# Patient Record
Sex: MALE | Race: WHITE | NOT HISPANIC OR LATINO | Employment: UNEMPLOYED | URBAN - NONMETROPOLITAN AREA
[De-identification: names, ages, dates, MRNs, and addresses within clinical notes are randomized per-mention and may not be internally consistent; named-entity substitution may affect disease eponyms.]

---

## 2017-01-12 DIAGNOSIS — I25.10 CORONARY ARTERY DISEASE INVOLVING NATIVE CORONARY ARTERY OF NATIVE HEART WITHOUT ANGINA PECTORIS: Primary | ICD-10-CM

## 2017-01-12 RX ORDER — CLOPIDOGREL BISULFATE 75 MG/1
75 TABLET ORAL DAILY
Qty: 30 TABLET | Refills: 6 | Status: SHIPPED | OUTPATIENT
Start: 2017-01-12 | End: 2017-08-08 | Stop reason: SDUPTHER

## 2017-03-08 ENCOUNTER — OFFICE VISIT (OUTPATIENT)
Dept: CARDIOLOGY | Facility: CLINIC | Age: 57
End: 2017-03-08

## 2017-03-08 VITALS
WEIGHT: 245.4 LBS | OXYGEN SATURATION: 99 % | HEART RATE: 68 BPM | SYSTOLIC BLOOD PRESSURE: 161 MMHG | RESPIRATION RATE: 16 BRPM | BODY MASS INDEX: 34.35 KG/M2 | DIASTOLIC BLOOD PRESSURE: 101 MMHG | HEIGHT: 71 IN

## 2017-03-08 DIAGNOSIS — E78.5 HYPERLIPIDEMIA, UNSPECIFIED HYPERLIPIDEMIA TYPE: ICD-10-CM

## 2017-03-08 DIAGNOSIS — E11.9 TYPE 2 DIABETES MELLITUS WITHOUT COMPLICATION, WITHOUT LONG-TERM CURRENT USE OF INSULIN (HCC): ICD-10-CM

## 2017-03-08 DIAGNOSIS — I10 ESSENTIAL HYPERTENSION: Primary | ICD-10-CM

## 2017-03-08 DIAGNOSIS — I25.10 CORONARY ARTERY DISEASE INVOLVING NATIVE CORONARY ARTERY OF NATIVE HEART WITHOUT ANGINA PECTORIS: ICD-10-CM

## 2017-03-08 PROCEDURE — 99214 OFFICE O/P EST MOD 30 MIN: CPT | Performed by: NURSE PRACTITIONER

## 2017-03-08 PROCEDURE — 93000 ELECTROCARDIOGRAM COMPLETE: CPT | Performed by: NURSE PRACTITIONER

## 2017-03-08 RX ORDER — CLONIDINE HYDROCHLORIDE 0.1 MG/1
0.1 TABLET ORAL ONCE
Status: COMPLETED | OUTPATIENT
Start: 2017-03-08 | End: 2017-03-08

## 2017-03-08 RX ORDER — CLONIDINE HYDROCHLORIDE 0.1 MG/1
0.1 TABLET ORAL 3 TIMES DAILY PRN
Qty: 30 TABLET | Refills: 5 | Status: SHIPPED | OUTPATIENT
Start: 2017-03-08 | End: 2018-08-21

## 2017-03-08 RX ADMIN — CLONIDINE HYDROCHLORIDE 0.1 MG: 0.1 TABLET ORAL at 09:40

## 2017-03-08 NOTE — PROGRESS NOTES
Procedure     ECG 12 Lead  Date/Time: 3/8/2017 9:46 AM  Performed by: TANA TORRES  Authorized by: TANA TORRES   Comparison: compared with previous ECG from 3/8/2016  Similar to previous ECG  Rhythm: sinus rhythm  Rate: normal  BPM: 62  QRS axis: normal  Q waves: II  Clinical impression: non-specific ECG

## 2017-03-08 NOTE — PATIENT INSTRUCTIONS
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
## 2017-04-19 ENCOUNTER — OFFICE VISIT (OUTPATIENT)
Dept: CARDIOLOGY | Facility: CLINIC | Age: 57
End: 2017-04-19

## 2017-04-19 VITALS
OXYGEN SATURATION: 99 % | HEIGHT: 71 IN | DIASTOLIC BLOOD PRESSURE: 99 MMHG | WEIGHT: 245 LBS | BODY MASS INDEX: 34.3 KG/M2 | SYSTOLIC BLOOD PRESSURE: 167 MMHG | HEART RATE: 65 BPM

## 2017-04-19 DIAGNOSIS — I10 ESSENTIAL HYPERTENSION: Primary | ICD-10-CM

## 2017-04-19 DIAGNOSIS — E78.5 HYPERLIPIDEMIA, UNSPECIFIED HYPERLIPIDEMIA TYPE: ICD-10-CM

## 2017-04-19 DIAGNOSIS — E11.9 TYPE 2 DIABETES MELLITUS WITHOUT COMPLICATION, WITHOUT LONG-TERM CURRENT USE OF INSULIN (HCC): ICD-10-CM

## 2017-04-19 DIAGNOSIS — I25.10 ATHEROSCLEROSIS OF NATIVE CORONARY ARTERY OF NATIVE HEART WITHOUT ANGINA PECTORIS: ICD-10-CM

## 2017-04-19 PROCEDURE — 99213 OFFICE O/P EST LOW 20 MIN: CPT | Performed by: NURSE PRACTITIONER

## 2017-04-19 RX ORDER — LISINOPRIL 40 MG/1
40 TABLET ORAL DAILY
COMMUNITY
End: 2017-07-18

## 2017-04-19 RX ORDER — LOSARTAN POTASSIUM 50 MG/1
50 TABLET ORAL 2 TIMES DAILY
COMMUNITY
Start: 2017-04-10 | End: 2018-07-10 | Stop reason: SDUPTHER

## 2017-04-19 NOTE — PATIENT INSTRUCTIONS
Hypertension  Hypertension, commonly called high blood pressure, is when the force of blood pumping through your arteries is too strong. Your arteries are the blood vessels that carry blood from your heart throughout your body. A blood pressure reading consists of a higher number over a lower number, such as 110/72. The higher number (systolic) is the pressure inside your arteries when your heart pumps. The lower number (diastolic) is the pressure inside your arteries when your heart relaxes. Ideally you want your blood pressure below 120/80.  Hypertension forces your heart to work harder to pump blood. Your arteries may become narrow or stiff. Having untreated or uncontrolled hypertension can cause heart attack, stroke, kidney disease, and other problems.  RISK FACTORS  Some risk factors for high blood pressure are controllable. Others are not.   Risk factors you cannot control include:   · Race. You may be at higher risk if you are .  · Age. Risk increases with age.  · Gender. Men are at higher risk than women before age 45 years. After age 65, women are at higher risk than men.  Risk factors you can control include:  · Not getting enough exercise or physical activity.  · Being overweight.  · Getting too much fat, sugar, calories, or salt in your diet.  · Drinking too much alcohol.  SIGNS AND SYMPTOMS  Hypertension does not usually cause signs or symptoms. Extremely high blood pressure (hypertensive crisis) may cause headache, anxiety, shortness of breath, and nosebleed.  DIAGNOSIS  To check if you have hypertension, your health care provider will measure your blood pressure while you are seated, with your arm held at the level of your heart. It should be measured at least twice using the same arm. Certain conditions can cause a difference in blood pressure between your right and left arms. A blood pressure reading that is higher than normal on one occasion does not mean that you need treatment. If  it is not clear whether you have high blood pressure, you may be asked to return on a different day to have your blood pressure checked again. Or, you may be asked to monitor your blood pressure at home for 1 or more weeks.  TREATMENT  Treating high blood pressure includes making lifestyle changes and possibly taking medicine. Living a healthy lifestyle can help lower high blood pressure. You may need to change some of your habits.  Lifestyle changes may include:  · Following the DASH diet. This diet is high in fruits, vegetables, and whole grains. It is low in salt, red meat, and added sugars.  · Keep your sodium intake below 2,300 mg per day.  · Getting at least 30-45 minutes of aerobic exercise at least 4 times per week.  · Losing weight if necessary.  · Not smoking.  · Limiting alcoholic beverages.  · Learning ways to reduce stress.  Your health care provider may prescribe medicine if lifestyle changes are not enough to get your blood pressure under control, and if one of the following is true:  · You are 18-59 years of age and your systolic blood pressure is above 140.  · You are 60 years of age or older, and your systolic blood pressure is above 150.  · Your diastolic blood pressure is above 90.  · You have diabetes, and your systolic blood pressure is over 140 or your diastolic blood pressure is over 90.  · You have kidney disease and your blood pressure is above 140/90.  · You have heart disease and your blood pressure is above 140/90.  Your personal target blood pressure may vary depending on your medical conditions, your age, and other factors.  HOME CARE INSTRUCTIONS  · Have your blood pressure rechecked as directed by your health care provider.    · Take medicines only as directed by your health care provider. Follow the directions carefully. Blood pressure medicines must be taken as prescribed. The medicine does not work as well when you skip doses. Skipping doses also puts you at risk for  "problems.  · Do not smoke.    · Monitor your blood pressure at home as directed by your health care provider.   SEEK MEDICAL CARE IF:   · You think you are having a reaction to medicines taken.  · You have recurrent headaches or feel dizzy.  · You have swelling in your ankles.  · You have trouble with your vision.  SEEK IMMEDIATE MEDICAL CARE IF:  · You develop a severe headache or confusion.  · You have unusual weakness, numbness, or feel faint.  · You have severe chest or abdominal pain.  · You vomit repeatedly.  · You have trouble breathing.  MAKE SURE YOU:   · Understand these instructions.  · Will watch your condition.  · Will get help right away if you are not doing well or get worse.     This information is not intended to replace advice given to you by your health care provider. Make sure you discuss any questions you have with your health care provider.     Document Released: 12/18/2006 Document Revised: 05/03/2016 Document Reviewed: 10/10/2014  [employer] Interactive Patient Education ©2016 [employer]    You Can Quit Smoking  If you are ready to quit smoking or are thinking about it, congratulations! You have chosen to help yourself be healthier and live longer! There are lots of different ways to quit smoking. Nicotine gum, nicotine patches, a nicotine inhaler, or nicotine nasal spray can help with physical craving. Hypnosis, support groups, and medicines help break the habit of smoking.  TIPS TO GET OFF AND STAY OFF CIGARETTES  · Learn to predict your moods. Do not let a bad situation be your excuse to have a cigarette. Some situations in your life might tempt you to have a cigarette.  · Ask friends and co-workers not to smoke around you.  · Make your home smoke-free.  · Never have \"just one\" cigarette. It leads to wanting another and another. Remind yourself of your decision to quit.  · On a card, make a list of your reasons for not smoking. Read it at least the same number of times a day as you have a " "cigarette. Tell yourself everyday, \"I do not want to smoke. I choose not to smoke.\"  · Ask someone at home or work to help you with your plan to quit smoking.  · Have something planned after you eat or have a cup of coffee. Take a walk or get other exercise to perk you up. This will help to keep you from overeating.  · Try a relaxation exercise to calm you down and decrease your stress. Remember, you may be tense and nervous the first two weeks after you quit. This will pass.  · Find new activities to keep your hands busy. Play with a pen, coin, or rubber band. Doodle or draw things on paper.  · Brush your teeth right after eating. This will help cut down the craving for the taste of tobacco after meals. You can try mouthwash too.  · Try gum, breath mints, or diet candy to keep something in your mouth.  IF YOU SMOKE AND WANT TO QUIT:  · Do not stock up on cigarettes. Never buy a carton. Wait until one pack is finished before you buy another.  · Never carry cigarettes with you at work or at home.  · Keep cigarettes as far away from you as possible. Leave them with someone else.  · Never carry matches or a lighter with you.  · Ask yourself, \"Do I need this cigarette or is this just a reflex?\"  · Bet with someone that you can quit. Put cigarette money in a [employer] bank every morning. If you smoke, you give up the money. If you do not smoke, by the end of the week, you keep the money.  · Keep trying. It takes 21 days to change a habit!  · Talk to your doctor about using medicines to help you quit. These include nicotine replacement gum, lozenges, or skin patches.     This information is not intended to replace advice given to you by your health care provider. Make sure you discuss any questions you have with your health care provider.     Document Released: 10/14/2010 Document Revised: 03/11/2013 Document Reviewed: 05/03/2016  [employer] Interactive Patient Education ©2016 [employer]    "

## 2017-04-19 NOTE — PROGRESS NOTES
Subjective   Herber Stein is a 56 y.o. male     Chief Complaint   Patient presents with   • Follow-up     6 week       HPI    Problem List:    1.) CAD status post stenting to RCA in 2012 with bare metal stent   a. Stress in 2014 showing posterolateral infarct with minimal martine-infarct ischemia.    b. Preserved Systolic fxn  2.) HTN  3.) Diabetes mellitus  4.) Shortness of breath  5.) Normal LV systolic function with EF 60%  5.1) Echo 11/10/14 - mild LVH; EF 50-55%; DD I; basal inferolateral wall segment is hypokinetic, trace AR, TR, MR and NH    Patient is a 56-year-old male who presents today for a follow-up.  He denies any chest pain, pressure, palpitations, fluttering, dizziness, presyncope, syncope, orthopnea, PND or edema.  He will get short of breath if he walks a long distance, but not with normal activity.  He has been trying to walk and he has a lot of hills by his house.  He says that he has had some stress due to his daughter being in a bad relationship.  He says that he also used his right arm to drive here and it is really hurting.  He brought his BP readings and he was at his PCP last week and they went ahead and increased his Losartan.  His BP has been doing better since.  He still smokes an occasional cigar.     Current Outpatient Prescriptions   Medication Sig Dispense Refill   • albuterol (VENTOLIN HFA) 108 (90 BASE) MCG/ACT inhaler Ventolin  (90 Base) MCG/ACT Inhalation Aerosol Solution; Patient Sig: Ventolin  (90 Base) MCG/ACT Inhalation Aerosol Solution ; 18; 0; 09-Sep-2014; Active     • aspirin (ASPIRIN LOW DOSE) 81 MG EC tablet Take  by mouth.     • atenolol (TENORMIN) 25 MG tablet Take  by mouth 2 (two) times a day.     • atorvastatin (LIPITOR) 40 MG tablet      • cetirizine (ZyrTEC) 10 MG tablet Take  by mouth daily.     • CloNIDine (CATAPRES) 0.1 MG tablet Take 1 tablet by mouth 3 (Three) Times a Day As Needed for high blood pressure (SBP > 160 or DBP > 90). 30 tablet 5    • clopidogrel (PLAVIX) 75 MG tablet Take 1 tablet by mouth Daily. 30 tablet 6   • lisinopril (PRINIVIL,ZESTRIL) 40 MG tablet Take 40 mg by mouth Daily.     • losartan (COZAAR) 50 MG tablet Daily.     • metFORMIN (GLUCOPHAGE) 500 MG tablet Take  by mouth 2 (two) times a day.     • Omega-3 Fatty Acids (FISH OIL) 1000 MG capsule capsule Take  by mouth daily.     • nitroglycerin (NITROSTAT) 0.4 MG SL tablet Place  under the tongue.       No current facility-administered medications for this visit.        ALLERGIES    Review of patient's allergies indicates no known allergies.    Past Medical History:   Diagnosis Date   • Arthritis    • Atherosclerotic heart disease of native coronary artery without angina pectoris    • Chest pain 8/31/2016   • Diabetes mellitus 8/31/2016   • Heart disease 8/31/2016   • HTN (hypertension) 8/31/2016   • Migraine headache    • Obesity 8/31/2016   • Sleep apnea    • Umbilical hernia        Social History     Social History   • Marital status: Single     Spouse name: N/A   • Number of children: N/A   • Years of education: N/A     Occupational History   • Not on file.     Social History Main Topics   • Smoking status: Current Some Day Smoker     Types: Cigars   • Smokeless tobacco: Not on file   • Alcohol use Yes      Comment: 6 pack per month   • Drug use: No   • Sexual activity: Not on file     Other Topics Concern   • Not on file     Social History Narrative       Family History   Problem Relation Age of Onset   • Stroke Mother    • Diabetes Mother    • Heart attack Mother    • Other Mother      CABG, Arotic Valve Replacement   • Hypertension Father    • Heart attack Brother        Review of Systems   Constitutional: Positive for fatigue (depends on activity). Negative for diaphoresis.   HENT: Positive for postnasal drip and rhinorrhea. Negative for sneezing.    Eyes: Positive for visual disturbance (wears glasses).   Respiratory: Positive for shortness of breath (if walk a distance he  "will get short of breath, but normal activity ok.). Negative for chest tightness.    Cardiovascular: Negative for chest pain, palpitations and leg swelling.   Gastrointestinal: Negative for nausea and vomiting.   Endocrine: Negative.    Genitourinary: Negative for difficulty urinating.   Musculoskeletal: Positive for arthralgias, back pain (shoulders ), myalgias and neck pain.        Right arm in sling   Skin: Negative.    Allergic/Immunologic: Positive for environmental allergies.   Neurological: Negative for dizziness, syncope and light-headedness.   Hematological: Bruises/bleeds easily (bruise).   Psychiatric/Behavioral: Positive for sleep disturbance (off and on).       Objective   /99 (BP Location: Left arm, Patient Position: Sitting)  Pulse 65  Ht 71\" (180.3 cm)  Wt 245 lb (111 kg)  SpO2 99%  BMI 34.17 kg/m2  Lab Results (most recent)     None        Physical Exam   Constitutional: He is oriented to person, place, and time. Vital signs are normal. He appears well-developed and well-nourished. He is active and cooperative.   HENT:   Head: Normocephalic.   Mouth/Throat: Dentures: missing teeth  Abnormal dentition.   Eyes: Lids are normal.   Wears glasses    Neck: Normal carotid pulses, no hepatojugular reflux and no JVD present. Carotid bruit is not present.   Cardiovascular: Normal rate, regular rhythm and normal heart sounds.    Pulses:       Radial pulses are 2+ on the right side, and 2+ on the left side.        Dorsalis pedis pulses are 2+ on the right side, and 2+ on the left side.        Posterior tibial pulses are 2+ on the right side, and 2+ on the left side.   No edema BLE.    Pulmonary/Chest: Effort normal and breath sounds normal.   Abdominal: Normal appearance.   Musculoskeletal:        Right shoulder: He exhibits tenderness.   Wears a sling on right arm    Neurological: He is alert and oriented to person, place, and time.   Skin: Skin is warm, dry and intact.   Psychiatric: He has a " normal mood and affect. His speech is normal and behavior is normal. Judgment and thought content normal. Cognition and memory are normal.         Assessment/Plan      Diagnosis Plan   1. Essential hypertension     2. Atherosclerosis of native coronary artery of native heart without angina pectoris     3. Type 2 diabetes mellitus without complication, without long-term current use of insulin     4. Hyperlipidemia, unspecified hyperlipidemia type         Return in about 3 months (around 7/19/2017).    Patient is doing well from a cardiac standpoint. He will continue his medication regimen.  He will follow-up in 3 mos or sooner if any changes.  He was encouraged on smoking cessation.

## 2017-07-18 ENCOUNTER — OFFICE VISIT (OUTPATIENT)
Dept: CARDIOLOGY | Facility: CLINIC | Age: 57
End: 2017-07-18

## 2017-07-18 VITALS
HEIGHT: 71 IN | DIASTOLIC BLOOD PRESSURE: 92 MMHG | SYSTOLIC BLOOD PRESSURE: 166 MMHG | WEIGHT: 247 LBS | HEART RATE: 66 BPM | BODY MASS INDEX: 34.58 KG/M2 | OXYGEN SATURATION: 98 %

## 2017-07-18 DIAGNOSIS — I25.10 CORONARY ARTERY DISEASE INVOLVING NATIVE CORONARY ARTERY OF NATIVE HEART WITHOUT ANGINA PECTORIS: ICD-10-CM

## 2017-07-18 DIAGNOSIS — F17.200 SMOKING: ICD-10-CM

## 2017-07-18 DIAGNOSIS — E78.5 HYPERLIPIDEMIA, UNSPECIFIED HYPERLIPIDEMIA TYPE: ICD-10-CM

## 2017-07-18 DIAGNOSIS — I10 ESSENTIAL HYPERTENSION: Primary | ICD-10-CM

## 2017-07-18 PROCEDURE — 99213 OFFICE O/P EST LOW 20 MIN: CPT | Performed by: NURSE PRACTITIONER

## 2017-07-18 RX ORDER — AMLODIPINE BESYLATE 5 MG/1
5 TABLET ORAL DAILY
Qty: 30 TABLET | Refills: 11 | Status: SHIPPED | OUTPATIENT
Start: 2017-07-18 | End: 2018-02-20 | Stop reason: ALTCHOICE

## 2017-07-18 NOTE — PROGRESS NOTES
Subjective   Herber Stein is a 56 y.o. male     Chief Complaint   Patient presents with   • Follow-up     presents as a follow up   • Hypertension       HPI    Problem List:    1.) CAD status post stenting to RCA in 2012 with bare metal stent   a. Stress in 2014 showing posterolateral infarct with minimal martine-infarct ischemia.    b. Preserved Systolic fxn  2.) HTN  3.) Diabetes mellitus  4.) Shortness of breath  5.) Normal LV systolic function with EF 60%  5.1) Echo 11/10/14 - mild LVH; EF 50-55%; DD I; basal inferolateral wall segment is hypokinetic, trace AR, TR, MR and AR    Patient is a 56-year-old male who presents today for follow-up.  He denies any chest pain, pressure, palpitations, fluttering, dizziness, presyncope, syncope, orthopnea, PND or edema.  Short of breath if he does more than normal.  He is still having some pain in his right arm status post accident in his blood pressure still seemed to be slightly elevated.  After reviewing his medications I noticed that he is on an arb and Ace he is not sure if he is actually on both of those at home or not.  If he is he will stop the lisinopril and he will start Norvasc 5.  His blood pressure still not being currently controlled he will go ahead and start Norvasc 5 regardless but monitor his blood pressure if it goes too low he will decrease the Norvasc to 2.5 daily.  PCP monitors his cholesterol.  Smokes a cigar occasionally.      Current Outpatient Prescriptions   Medication Sig Dispense Refill   • albuterol (VENTOLIN HFA) 108 (90 BASE) MCG/ACT inhaler Ventolin  (90 Base) MCG/ACT Inhalation Aerosol Solution; Patient Sig: Ventolin  (90 Base) MCG/ACT Inhalation Aerosol Solution ; 18; 0; 09-Sep-2014; Active     • aspirin (ASPIRIN LOW DOSE) 81 MG EC tablet Take  by mouth.     • atenolol (TENORMIN) 25 MG tablet Take  by mouth 2 (two) times a day.     • atorvastatin (LIPITOR) 40 MG tablet      • cetirizine (ZyrTEC) 10 MG tablet Take  by mouth  daily.     • CloNIDine (CATAPRES) 0.1 MG tablet Take 1 tablet by mouth 3 (Three) Times a Day As Needed for high blood pressure (SBP > 160 or DBP > 90). 30 tablet 5   • clopidogrel (PLAVIX) 75 MG tablet Take 1 tablet by mouth Daily. 30 tablet 6   • losartan (COZAAR) 50 MG tablet 50 mg 2 (Two) Times a Day.     • metFORMIN (GLUCOPHAGE) 500 MG tablet Take  by mouth 2 (two) times a day.     • nitroglycerin (NITROSTAT) 0.4 MG SL tablet Place  under the tongue.     • Omega-3 Fatty Acids (FISH OIL) 1000 MG capsule capsule Take  by mouth daily.     • amLODIPine (NORVASC) 5 MG tablet Take 1 tablet by mouth Daily. 30 tablet 11     No current facility-administered medications for this visit.        ALLERGIES    Review of patient's allergies indicates no known allergies.    Past Medical History:   Diagnosis Date   • Arthritis    • Atherosclerotic heart disease of native coronary artery without angina pectoris    • Chest pain 8/31/2016   • Diabetes mellitus 8/31/2016   • Heart disease 8/31/2016   • HTN (hypertension) 8/31/2016   • Migraine headache    • Obesity 8/31/2016   • Sleep apnea    • Umbilical hernia        Social History     Social History   • Marital status: Single     Spouse name: N/A   • Number of children: N/A   • Years of education: N/A     Occupational History   • Not on file.     Social History Main Topics   • Smoking status: Current Some Day Smoker     Types: Cigars   • Smokeless tobacco: Not on file   • Alcohol use Yes      Comment: 6 pack per month   • Drug use: No   • Sexual activity: Not on file     Other Topics Concern   • Not on file     Social History Narrative       Family History   Problem Relation Age of Onset   • Stroke Mother    • Diabetes Mother    • Heart attack Mother    • Other Mother      CABG, Arotic Valve Replacement   • Hypertension Father    • Heart attack Brother        Review of Systems   Constitutional: Negative for diaphoresis and fatigue.   HENT: Positive for rhinorrhea and sneezing.   "  Eyes: Positive for visual disturbance (wears glasses).   Respiratory: Positive for shortness of breath (if do more than normal ). Negative for chest tightness.    Cardiovascular: Negative for chest pain, palpitations and leg swelling.   Gastrointestinal: Negative for nausea and vomiting.   Endocrine: Negative.    Genitourinary: Negative for difficulty urinating.   Musculoskeletal: Positive for arthralgias, back pain and neck pain.   Skin: Negative.    Allergic/Immunologic: Positive for environmental allergies.   Neurological: Positive for numbness (right elbow down ). Negative for dizziness, syncope and light-headedness.   Hematological: Negative.    Psychiatric/Behavioral: Negative.        Objective   /92 (BP Location: Left arm, Patient Position: Sitting)  Pulse 66  Ht 71\" (180.3 cm)  Wt 247 lb (112 kg)  SpO2 98%  BMI 34.45 kg/m2  Lab Results (most recent)     None        Physical Exam   Constitutional: He is oriented to person, place, and time. Vital signs are normal. He appears well-developed and well-nourished. He is active and cooperative.   HENT:   Head: Normocephalic.   Mouth/Throat: Abnormal dentition (missing teeth ).   Eyes: Lids are normal.   Wears glasses    Neck: Normal carotid pulses, no hepatojugular reflux and no JVD present. Carotid bruit is not present.   Cardiovascular: Normal rate, regular rhythm and normal heart sounds.    Pulses:       Radial pulses are 2+ on the right side, and 2+ on the left side.        Dorsalis pedis pulses are 2+ on the right side, and 2+ on the left side.        Posterior tibial pulses are 2+ on the right side, and 2+ on the left side.   No edema BLE.    Pulmonary/Chest: Effort normal and breath sounds normal.   Abdominal: Normal appearance and bowel sounds are normal.   Neurological: He is alert and oriented to person, place, and time.   Skin: Skin is warm, dry and intact.   Psychiatric: He has a normal mood and affect. His speech is normal and behavior is " normal. Judgment and thought content normal. Cognition and memory are normal.       Procedure   Procedures         Assessment/Plan      Diagnosis Plan   1. Essential hypertension  Adult Transthoracic Echo Complete    amLODIPine (NORVASC) 5 MG tablet    Adult Transthoracic Echo Complete   2. Coronary artery disease involving native coronary artery of native heart without angina pectoris  Adult Transthoracic Echo Complete    Adult Transthoracic Echo Complete   3. Hyperlipidemia, unspecified hyperlipidemia type  Adult Transthoracic Echo Complete    Adult Transthoracic Echo Complete   4. Smoking         Return in about 6 weeks (around 8/29/2017).    Hypertension-patient will stop lisinopril if he is still taking it because he is also taking an Arb.  CAD-patient is on aspirin, beta and statin.  Hyperlipidemia-patient is on statin and is monitored by his PCP.  Patient will get a echocardiogram to reassess his EF.  He will monitor his blood pressure and bring to his follow-up appointment.  He will follow-up in 6 weeks or sooner if any changes.  Smoking-he was encouraged on smoking cessation.

## 2017-07-18 NOTE — PATIENT INSTRUCTIONS
Hypertension  Hypertension, commonly called high blood pressure, is when the force of blood pumping through your arteries is too strong. Your arteries are the blood vessels that carry blood from your heart throughout your body. A blood pressure reading consists of a higher number over a lower number, such as 110/72. The higher number (systolic) is the pressure inside your arteries when your heart pumps. The lower number (diastolic) is the pressure inside your arteries when your heart relaxes. Ideally you want your blood pressure below 120/80.  Hypertension forces your heart to work harder to pump blood. Your arteries may become narrow or stiff. Having untreated or uncontrolled hypertension can cause heart attack, stroke, kidney disease, and other problems.  RISK FACTORS  Some risk factors for high blood pressure are controllable. Others are not.   Risk factors you cannot control include:   · Race. You may be at higher risk if you are .  · Age. Risk increases with age.  · Gender. Men are at higher risk than women before age 45 years. After age 65, women are at higher risk than men.  Risk factors you can control include:  · Not getting enough exercise or physical activity.  · Being overweight.  · Getting too much fat, sugar, calories, or salt in your diet.  · Drinking too much alcohol.  SIGNS AND SYMPTOMS  Hypertension does not usually cause signs or symptoms. Extremely high blood pressure (hypertensive crisis) may cause headache, anxiety, shortness of breath, and nosebleed.  DIAGNOSIS  To check if you have hypertension, your health care provider will measure your blood pressure while you are seated, with your arm held at the level of your heart. It should be measured at least twice using the same arm. Certain conditions can cause a difference in blood pressure between your right and left arms. A blood pressure reading that is higher than normal on one occasion does not mean that you need treatment. If  it is not clear whether you have high blood pressure, you may be asked to return on a different day to have your blood pressure checked again. Or, you may be asked to monitor your blood pressure at home for 1 or more weeks.  TREATMENT  Treating high blood pressure includes making lifestyle changes and possibly taking medicine. Living a healthy lifestyle can help lower high blood pressure. You may need to change some of your habits.  Lifestyle changes may include:  · Following the DASH diet. This diet is high in fruits, vegetables, and whole grains. It is low in salt, red meat, and added sugars.  · Keep your sodium intake below 2,300 mg per day.  · Getting at least 30-45 minutes of aerobic exercise at least 4 times per week.  · Losing weight if necessary.  · Not smoking.  · Limiting alcoholic beverages.  · Learning ways to reduce stress.  Your health care provider may prescribe medicine if lifestyle changes are not enough to get your blood pressure under control, and if one of the following is true:  · You are 18-59 years of age and your systolic blood pressure is above 140.  · You are 60 years of age or older, and your systolic blood pressure is above 150.  · Your diastolic blood pressure is above 90.  · You have diabetes, and your systolic blood pressure is over 140 or your diastolic blood pressure is over 90.  · You have kidney disease and your blood pressure is above 140/90.  · You have heart disease and your blood pressure is above 140/90.  Your personal target blood pressure may vary depending on your medical conditions, your age, and other factors.  HOME CARE INSTRUCTIONS  · Have your blood pressure rechecked as directed by your health care provider.    · Take medicines only as directed by your health care provider. Follow the directions carefully. Blood pressure medicines must be taken as prescribed. The medicine does not work as well when you skip doses. Skipping doses also puts you at risk for  "problems.  · Do not smoke.    · Monitor your blood pressure at home as directed by your health care provider.   SEEK MEDICAL CARE IF:   · You think you are having a reaction to medicines taken.  · You have recurrent headaches or feel dizzy.  · You have swelling in your ankles.  · You have trouble with your vision.  SEEK IMMEDIATE MEDICAL CARE IF:  · You develop a severe headache or confusion.  · You have unusual weakness, numbness, or feel faint.  · You have severe chest or abdominal pain.  · You vomit repeatedly.  · You have trouble breathing.  MAKE SURE YOU:   · Understand these instructions.  · Will watch your condition.  · Will get help right away if you are not doing well or get worse.     This information is not intended to replace advice given to you by your health care provider. Make sure you discuss any questions you have with your health care provider.     Document Released: 12/18/2006 Document Revised: 05/03/2016 Document Reviewed: 10/10/2014  [employer] Interactive Patient Education ©2017 [employer]    You Can Quit Smoking  If you are ready to quit smoking or are thinking about it, congratulations! You have chosen to help yourself be healthier and live longer! There are lots of different ways to quit smoking. Nicotine gum, nicotine patches, a nicotine inhaler, or nicotine nasal spray can help with physical craving. Hypnosis, support groups, and medicines help break the habit of smoking.  TIPS TO GET OFF AND STAY OFF CIGARETTES  · Learn to predict your moods. Do not let a bad situation be your excuse to have a cigarette. Some situations in your life might tempt you to have a cigarette.  · Ask friends and co-workers not to smoke around you.  · Make your home smoke-free.  · Never have \"just one\" cigarette. It leads to wanting another and another. Remind yourself of your decision to quit.  · On a card, make a list of your reasons for not smoking. Read it at least the same number of times a day as you have a " "cigarette. Tell yourself everyday, \"I do not want to smoke. I choose not to smoke.\"  · Ask someone at home or work to help you with your plan to quit smoking.  · Have something planned after you eat or have a cup of coffee. Take a walk or get other exercise to perk you up. This will help to keep you from overeating.  · Try a relaxation exercise to calm you down and decrease your stress. Remember, you may be tense and nervous the first two weeks after you quit. This will pass.  · Find new activities to keep your hands busy. Play with a pen, coin, or rubber band. Doodle or draw things on paper.  · Brush your teeth right after eating. This will help cut down the craving for the taste of tobacco after meals. You can try mouthwash too.  · Try gum, breath mints, or diet candy to keep something in your mouth.  IF YOU SMOKE AND WANT TO QUIT:  · Do not stock up on cigarettes. Never buy a carton. Wait until one pack is finished before you buy another.  · Never carry cigarettes with you at work or at home.  · Keep cigarettes as far away from you as possible. Leave them with someone else.  · Never carry matches or a lighter with you.  · Ask yourself, \"Do I need this cigarette or is this just a reflex?\"  · Bet with someone that you can quit. Put cigarette money in a [employer] bank every morning. If you smoke, you give up the money. If you do not smoke, by the end of the week, you keep the money.  · Keep trying. It takes 21 days to change a habit!  · Talk to your doctor about using medicines to help you quit. These include nicotine replacement gum, lozenges, or skin patches.     This information is not intended to replace advice given to you by your health care provider. Make sure you discuss any questions you have with your health care provider.     Document Released: 10/14/2010 Document Revised: 03/11/2013 Document Reviewed: 05/03/2016  [employer] Interactive Patient Education ©2017 [employer]    "

## 2017-08-08 ENCOUNTER — TELEPHONE (OUTPATIENT)
Dept: CARDIOLOGY | Facility: CLINIC | Age: 57
End: 2017-08-08

## 2017-08-08 DIAGNOSIS — I25.10 CORONARY ARTERY DISEASE INVOLVING NATIVE CORONARY ARTERY OF NATIVE HEART WITHOUT ANGINA PECTORIS: ICD-10-CM

## 2017-08-08 RX ORDER — CLOPIDOGREL BISULFATE 75 MG/1
75 TABLET ORAL DAILY
Qty: 30 TABLET | Refills: 6 | Status: SHIPPED | OUTPATIENT
Start: 2017-08-08 | End: 2018-02-20 | Stop reason: SDUPTHER

## 2017-08-08 NOTE — TELEPHONE ENCOUNTER
----- Message from Divya Granda sent at 8/8/2017 12:10 PM EDT -----  Contact: pt  PT IS NEEDING HIS CLOPIDOGREL 75MG CALLED IN. [facility]

## 2017-08-29 ENCOUNTER — OFFICE VISIT (OUTPATIENT)
Dept: CARDIOLOGY | Facility: CLINIC | Age: 57
End: 2017-08-29

## 2017-08-29 VITALS
WEIGHT: 250.8 LBS | OXYGEN SATURATION: 97 % | HEART RATE: 69 BPM | HEIGHT: 71 IN | BODY MASS INDEX: 35.11 KG/M2 | SYSTOLIC BLOOD PRESSURE: 147 MMHG | DIASTOLIC BLOOD PRESSURE: 98 MMHG

## 2017-08-29 DIAGNOSIS — I25.10 CORONARY ARTERY DISEASE INVOLVING NATIVE CORONARY ARTERY OF NATIVE HEART WITHOUT ANGINA PECTORIS: Primary | ICD-10-CM

## 2017-08-29 DIAGNOSIS — I10 ESSENTIAL HYPERTENSION: ICD-10-CM

## 2017-08-29 DIAGNOSIS — E78.5 HYPERLIPIDEMIA, UNSPECIFIED HYPERLIPIDEMIA TYPE: ICD-10-CM

## 2017-08-29 PROCEDURE — 99213 OFFICE O/P EST LOW 20 MIN: CPT | Performed by: NURSE PRACTITIONER

## 2017-08-29 NOTE — PATIENT INSTRUCTIONS
Alzheimer Disease Caregiver Guide  Alzheimer disease is an illness that affects a person's brain. It causes a person to lose the ability to remember things and make good decisions. As the disease progresses, the person is unable to take care of himself or herself and needs more and more help to do simple tasks. Taking care of someone with Alzheimer disease can be very challenging and overwhelming.   MEMORY LOSS AND CONFUSION  Memory loss and confusion is mild in the beginning stages of the disease. Both of these problems become more severe as the disease progresses. Eventually, the person will not recognize places or even close family members and friends.   · Stay calm.  · Respond with a short explanation. Long explanations can be overwhelming and confusing.  · Avoid corrections that sound like scolding.  · Try not to take it personally, even if the person forgets your name.  BEHAVIOR CHANGES  Behavior changes are part of the disease. The person may develop depression, anxiety, anger, hallucinations, or other behavior changes. These changes can come on suddenly and may be in response to pain, infection, changes in the environment (temperature, noise), overstimulation, or feeling lost or scared.   · Try not to take behavior changes personally.  · Remain calm and patient.  · Do not argue or try to convince the person about a specific point. This will only make him or her more agitated.  · Know that the behavior changes are part of the disease process and try to work through it.  TIPS TO REDUCE FRUSTRATION  · Schedule wisely by making appointments and doing daily tasks, like bathing and dressing, when the person is at his or her best.  · Take your time. Simple tasks may take a lot longer, so be sure to allow for plenty of time.  · Limit choices. Too many choices can be overwhelming and stressful for the person.  · Involve the person in what you are doing.  · Stick to a routine.  · Avoid new or crowded situations, if  possible.  · Use simple words, short sentences, and a calm voice. Only give one direction at a time.  · Buy clothes and shoes that are easy to put on and take off.  · Let people help if they offer.  HOME SAFETY  Keeping the home safe is very important to reduce the risk of falls and injuries.   · Keep floors clear of clutter. Remove rugs, magazine racks, and floor lamps.  · Keep hallways well lit.  · Put a handrail and nonslip mat in the bathtub or shower.  · Put childproof locks on cabinets with dangerous items, such as medicine, alcohol, guns, toxic cleaning items, sharp tools or utensils, matches, or lighters.  · Place locks on doors where the person cannot easily see or reach them. This helps ensure that the person cannot wander out of the house and get lost.  · Be prepared for emergencies. Keep a list of emergency phone numbers and addresses in a convenient area.  PLANS FOR THE FUTURE  · Do not put off talking about finances.  ¨ Talk about money management. People with Alzheimer disease have trouble managing their money as the disease gets worse.  ¨ Get help from professional advisors regarding financial and legal matters.  · Do not put off talking about future care.  ¨ Choose a power of . This is someone who can make decisions for the person with Alzheimer disease when he or she is no longer able to do so.  ¨ Talk about driving and when it is the right time to stop. The person's health care provider can help give advice on this matter.  ¨ Talk about the person's living situation. If he or she lives alone, you need to make sure he or she is safe. Some people need extra help at home, and others need more care at a nursing home or care center.  SUPPORT GROUPS  Joining a support group can be very helpful for caregivers of people with Alzheimer disease. Some advantages to being part of a support group include:   · Getting strategies to manage stress.  · Sharing experiences with others.  · Receiving  emotional comfort and support.  · Learning new caregiving skills as the disease progresses.  · Knowing what community resources are available and taking advantage of them.  SEEK MEDICAL CARE IF:  · The person has a fever.  · The person has a sudden change in behavior that does not improve with calming strategies.  · The person is unable to manage in his or her current living situation.  · The person threatens you or anyone else, including himself or herself.  · You are no longer able to care for the person.     This information is not intended to replace advice given to you by your health care provider. Make sure you discuss any questions you have with your health care provider.     Document Released: 08/29/2005 Document Revised: 01/08/2016 Document Reviewed: 01/23/2013  [employer] ([employer]: [place]) Interactive Patient Education ©2017 [employer]  Hypertension  Hypertension, commonly called high blood pressure, is when the force of blood pumping through your arteries is too strong. Your arteries are the blood vessels that carry blood from your heart throughout your body. A blood pressure reading consists of a higher number over a lower number, such as 110/72. The higher number (systolic) is the pressure inside your arteries when your heart pumps. The lower number (diastolic) is the pressure inside your arteries when your heart relaxes. Ideally you want your blood pressure below 120/80.  Hypertension forces your heart to work harder to pump blood. Your arteries may become narrow or stiff. Having untreated or uncontrolled hypertension can cause heart attack, stroke, kidney disease, and other problems.  RISK FACTORS  Some risk factors for high blood pressure are controllable. Others are not.   Risk factors you cannot control include:   · Race. You may be at higher risk if you are .  · Age. Risk increases with age.  · Gender. Men are at higher risk than women before age 45 years. After age 65, women are at higher risk  than men.  Risk factors you can control include:  · Not getting enough exercise or physical activity.  · Being overweight.  · Getting too much fat, sugar, calories, or salt in your diet.  · Drinking too much alcohol.  SIGNS AND SYMPTOMS  Hypertension does not usually cause signs or symptoms. Extremely high blood pressure (hypertensive crisis) may cause headache, anxiety, shortness of breath, and nosebleed.  DIAGNOSIS  To check if you have hypertension, your health care provider will measure your blood pressure while you are seated, with your arm held at the level of your heart. It should be measured at least twice using the same arm. Certain conditions can cause a difference in blood pressure between your right and left arms. A blood pressure reading that is higher than normal on one occasion does not mean that you need treatment. If it is not clear whether you have high blood pressure, you may be asked to return on a different day to have your blood pressure checked again. Or, you may be asked to monitor your blood pressure at home for 1 or more weeks.  TREATMENT  Treating high blood pressure includes making lifestyle changes and possibly taking medicine. Living a healthy lifestyle can help lower high blood pressure. You may need to change some of your habits.  Lifestyle changes may include:  · Following the DASH diet. This diet is high in fruits, vegetables, and whole grains. It is low in salt, red meat, and added sugars.  · Keep your sodium intake below 2,300 mg per day.  · Getting at least 30-45 minutes of aerobic exercise at least 4 times per week.  · Losing weight if necessary.  · Not smoking.  · Limiting alcoholic beverages.  · Learning ways to reduce stress.  Your health care provider may prescribe medicine if lifestyle changes are not enough to get your blood pressure under control, and if one of the following is true:  · You are 18-59 years of age and your systolic blood pressure is above 140.  · You are  60 years of age or older, and your systolic blood pressure is above 150.  · Your diastolic blood pressure is above 90.  · You have diabetes, and your systolic blood pressure is over 140 or your diastolic blood pressure is over 90.  · You have kidney disease and your blood pressure is above 140/90.  · You have heart disease and your blood pressure is above 140/90.  Your personal target blood pressure may vary depending on your medical conditions, your age, and other factors.  HOME CARE INSTRUCTIONS  · Have your blood pressure rechecked as directed by your health care provider.    · Take medicines only as directed by your health care provider. Follow the directions carefully. Blood pressure medicines must be taken as prescribed. The medicine does not work as well when you skip doses. Skipping doses also puts you at risk for problems.  · Do not smoke.    · Monitor your blood pressure at home as directed by your health care provider.   SEEK MEDICAL CARE IF:   · You think you are having a reaction to medicines taken.  · You have recurrent headaches or feel dizzy.  · You have swelling in your ankles.  · You have trouble with your vision.  SEEK IMMEDIATE MEDICAL CARE IF:  · You develop a severe headache or confusion.  · You have unusual weakness, numbness, or feel faint.  · You have severe chest or abdominal pain.  · You vomit repeatedly.  · You have trouble breathing.  MAKE SURE YOU:   · Understand these instructions.  · Will watch your condition.  · Will get help right away if you are not doing well or get worse.     This information is not intended to replace advice given to you by your health care provider. Make sure you discuss any questions you have with your health care provider.     Document Released: 12/18/2006 Document Revised: 05/03/2016 Document Reviewed: 10/10/2014  [facility] Interactive Patient Education ©2017 [facility] [employer]
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
## 2018-02-20 ENCOUNTER — OFFICE VISIT (OUTPATIENT)
Dept: CARDIOLOGY | Facility: CLINIC | Age: 58
End: 2018-02-20

## 2018-02-20 VITALS
DIASTOLIC BLOOD PRESSURE: 83 MMHG | OXYGEN SATURATION: 98 % | HEART RATE: 69 BPM | BODY MASS INDEX: 34.66 KG/M2 | SYSTOLIC BLOOD PRESSURE: 119 MMHG | WEIGHT: 247.6 LBS | HEIGHT: 71 IN

## 2018-02-20 DIAGNOSIS — I25.10 CORONARY ARTERY DISEASE INVOLVING NATIVE CORONARY ARTERY OF NATIVE HEART WITHOUT ANGINA PECTORIS: Primary | ICD-10-CM

## 2018-02-20 DIAGNOSIS — E78.5 HYPERLIPIDEMIA, UNSPECIFIED HYPERLIPIDEMIA TYPE: ICD-10-CM

## 2018-02-20 DIAGNOSIS — E66.9 CLASS 1 OBESITY WITH SERIOUS COMORBIDITY AND BODY MASS INDEX (BMI) OF 34.0 TO 34.9 IN ADULT, UNSPECIFIED OBESITY TYPE: ICD-10-CM

## 2018-02-20 DIAGNOSIS — I10 ESSENTIAL HYPERTENSION: ICD-10-CM

## 2018-02-20 DIAGNOSIS — F17.200 SMOKING: ICD-10-CM

## 2018-02-20 PROCEDURE — 93000 ELECTROCARDIOGRAM COMPLETE: CPT | Performed by: NURSE PRACTITIONER

## 2018-02-20 PROCEDURE — 99214 OFFICE O/P EST MOD 30 MIN: CPT | Performed by: NURSE PRACTITIONER

## 2018-02-20 RX ORDER — CLOPIDOGREL BISULFATE 75 MG/1
75 TABLET ORAL DAILY
Qty: 90 TABLET | Refills: 3 | Status: SHIPPED | OUTPATIENT
Start: 2018-02-20 | End: 2018-11-13 | Stop reason: SDUPTHER

## 2018-02-20 RX ORDER — AMLODIPINE BESYLATE AND BENAZEPRIL HYDROCHLORIDE 5; 10 MG/1; MG/1
1 CAPSULE ORAL DAILY
Refills: 0 | COMMUNITY
Start: 2018-01-20 | End: 2018-07-10 | Stop reason: SDUPTHER

## 2018-02-20 NOTE — PROGRESS NOTES
Subjective   Herber Stein is a 57 y.o. male     Chief Complaint   Patient presents with   • Coronary Artery Disease     presents as a follow up       HPI    Problem List:    1.) CAD status post stenting to RCA in 2012 with bare metal stent   a. Stress in 2014 showing posterolateral infarct with minimal martine-infarct ischemia.    b. Preserved Systolic fxn  2.) HTN  3.) Diabetes mellitus  4.) Shortness of breath  5.) Normal LV systolic function with EF 60%  5.1) Echo 11/10/14 - mild LVH; EF 50-55%; DD I; basal inferolateral wall segment is hypokinetic, trace AR, TR, MR and ND  6) Smoking Habituation, Cigars    Patient is a 57-year-old male who presents today for follow-up.  He denies any chest pain, pressure, palpitations, fluttering, dizziness, presyncope, syncope, orthopnea, PND or edema.  He says that he only gets short of breath if he does more than normal or if he just pushes it too much.  He also states that he is just getting over the flu.  PCP monitors his cholesterol.  He says he will have a cigar every now and then.    Current Outpatient Prescriptions   Medication Sig Dispense Refill   • amLODIPine-benazepril (LOTREL 5-10) 5-10 MG per capsule Take 1 capsule by mouth Daily.  0   • aspirin (ASPIRIN LOW DOSE) 81 MG EC tablet Take 81 mg by mouth Daily.     • atenolol (TENORMIN) 25 MG tablet Take 25 mg by mouth Daily.     • atorvastatin (LIPITOR) 40 MG tablet Take 40 mg by mouth Daily.     • cetirizine (ZyrTEC) 10 MG tablet Take 10 mg by mouth Daily.     • CloNIDine (CATAPRES) 0.1 MG tablet Take 1 tablet by mouth 3 (Three) Times a Day As Needed for high blood pressure (SBP > 160 or DBP > 90). 30 tablet 5   • clopidogrel (PLAVIX) 75 MG tablet Take 1 tablet by mouth Daily. 90 tablet 3   • losartan (COZAAR) 50 MG tablet Take 50 mg by mouth 2 (Two) Times a Day.     • metFORMIN (GLUCOPHAGE) 500 MG tablet Take 1,000 mg by mouth 2 (Two) Times a Day. 2 tablets twice a day     • nitroglycerin (NITROSTAT) 0.4 MG SL  tablet Place  under the tongue.     • Omega-3 Fatty Acids (FISH OIL) 1000 MG capsule capsule Take  by mouth daily.       No current facility-administered medications for this visit.        ALLERGIES    Review of patient's allergies indicates no known allergies.    Past Medical History:   Diagnosis Date   • Arthritis    • Atherosclerotic heart disease of native coronary artery without angina pectoris    • Chest pain 8/31/2016   • Diabetes mellitus 8/31/2016   • Heart disease 8/31/2016   • HTN (hypertension) 8/31/2016   • Migraine headache    • Obesity 8/31/2016   • Sleep apnea    • Umbilical hernia        Social History     Social History   • Marital status: Single     Spouse name: N/A   • Number of children: N/A   • Years of education: N/A     Occupational History   • Not on file.     Social History Main Topics   • Smoking status: Current Some Day Smoker     Types: Cigars   • Smokeless tobacco: Never Used   • Alcohol use Yes      Comment: 6 pack per month   • Drug use: No   • Sexual activity: Not on file     Other Topics Concern   • Not on file     Social History Narrative       Family History   Problem Relation Age of Onset   • Stroke Mother    • Diabetes Mother    • Heart attack Mother    • Other Mother      CABG, Arotic Valve Replacement   • Hypertension Father    • Heart attack Brother        Review of Systems   Constitutional: Positive for fatigue ( just getting over the flu). Negative for diaphoresis.   HENT: Positive for congestion, rhinorrhea and sneezing. Negative for sinus pressure.         Had flu last month    Eyes: Positive for visual disturbance ( wears glasses).   Respiratory: Positive for shortness of breath (if do more than normal or push it too much ). Negative for chest tightness.    Cardiovascular: Negative for chest pain, palpitations and leg swelling.   Gastrointestinal: Negative for constipation, diarrhea, nausea and vomiting.   Endocrine: Negative.    Genitourinary: Negative for difficulty  "urinating.   Musculoskeletal: Positive for arthralgias. Negative for back pain, myalgias and neck pain.   Skin: Negative.    Allergic/Immunologic: Positive for environmental allergies.   Neurological: Negative for dizziness, syncope, light-headedness and headaches.   Hematological: Does not bruise/bleed easily.   Psychiatric/Behavioral: The patient is nervous/anxious.        Objective   /83 (BP Location: Left arm, Patient Position: Sitting)  Pulse 69  Ht 180.3 cm (71\")  Wt 112 kg (247 lb 9.6 oz)  SpO2 98%  BMI 34.53 kg/m2  Vitals:    02/20/18 0915   BP: 119/83   BP Location: Left arm   Patient Position: Sitting   Pulse: 69   SpO2: 98%   Weight: 112 kg (247 lb 9.6 oz)   Height: 180.3 cm (71\")      Lab Results (most recent)     None        Physical Exam   Constitutional: He is oriented to person, place, and time. Vital signs are normal. He appears well-developed and well-nourished. He is active and cooperative.   HENT:   Head: Normocephalic.   Mouth/Throat: Abnormal dentition (poor ).   Eyes: Lids are normal.   Wears glasses    Neck: Normal carotid pulses, no hepatojugular reflux and no JVD present. Carotid bruit is not present.   Cardiovascular: Normal rate, regular rhythm and normal heart sounds.    Pulses:       Radial pulses are 2+ on the right side, and 2+ on the left side.        Dorsalis pedis pulses are 2+ on the right side, and 2+ on the left side.        Posterior tibial pulses are 2+ on the right side, and 2+ on the left side.   No edema BLE.    Pulmonary/Chest: Effort normal and breath sounds normal.   Abdominal: Normal appearance and bowel sounds are normal.   Neurological: He is alert and oriented to person, place, and time.   Skin: Skin is warm, dry and intact.   Psychiatric: He has a normal mood and affect. His speech is normal and behavior is normal. Judgment and thought content normal. Cognition and memory are normal.       Procedure     ECG 12 Lead  Date/Time: 2/20/2018 9:28 AM  Performed " by: TANA TORRES  Authorized by: TANA TORRES   Comparison: compared with previous ECG from 3/8/2017  Similar to previous ECG  Rhythm: sinus rhythm  Rate: normal  BPM: 65  QRS axis: normal  Clinical impression: normal ECG                 Assessment/Plan      Diagnosis Plan   1. Coronary artery disease involving native coronary artery of native heart without angina pectoris  clopidogrel (PLAVIX) 75 MG tablet   2. Essential hypertension     3. Hyperlipidemia, unspecified hyperlipidemia type     4. Class 1 obesity with serious comorbidity and body mass index (BMI) of 34.0 to 34.9 in adult, unspecified obesity type     5. Smoking         Return in about 6 months (around 8/20/2018).       CAD-patient is on aspirin, beta and statin.  Hypertension-patient doing well.  Hyperlipidemia-patient is on Lipitor monitor by PCP.  Obesity-patient provided educational material.  Smoking-patient encouraged on smoking cessation.  He will continue his medication regimen.  He will follow-up in 6 months or sooner if any changes.    Electronically signed by:

## 2018-02-20 NOTE — PATIENT INSTRUCTIONS
Obesity, Adult  Obesity is the condition of having too much total body fat. Being overweight or obese means that your weight is greater than what is considered healthy for your body size. Obesity is determined by a measurement called BMI. BMI is an estimate of body fat and is calculated from height and weight. For adults, a BMI of 30 or higher is considered obese.  Obesity can eventually lead to other health concerns and major illnesses, including:  · Stroke.  · Coronary artery disease (CAD).  · Type 2 diabetes.  · Some types of cancer, including cancers of the colon, breast, uterus, and gallbladder.  · Osteoarthritis.  · High blood pressure (hypertension).  · High cholesterol.  · Sleep apnea.  · Gallbladder stones.  · Infertility problems.  What are the causes?  The main cause of obesity is taking in (consuming) more calories than your body uses for energy. Other factors that contribute to this condition may include:  · Being born with genes that make you more likely to become obese.  · Having a medical condition that causes obesity. These conditions include:  ¨ Hypothyroidism.  ¨ Polycystic ovarian syndrome (PCOS).  ¨ Binge-eating disorder.  ¨ Cushing syndrome.  · Taking certain medicines, such as steroids, antidepressants, and seizure medicines.  · Not being physically active (sedentary lifestyle).  · Living where there are limited places to exercise safely or buy healthy foods.  · Not getting enough sleep.  What increases the risk?  The following factors may increase your risk of this condition:  · Having a family history of obesity.  · Being a woman of -American descent.  · Being a man of  descent.  What are the signs or symptoms?  Having excessive body fat is the main symptom of this condition.  How is this diagnosed?  This condition may be diagnosed based on:  · Your symptoms.  · Your medical history.  · A physical exam. Your health care provider may measure:  ¨ Your BMI. If you are an  adult with a BMI between 25 and less than 30, you are considered overweight. If you are an adult with a BMI of 30 or higher, you are considered obese.  ¨ The distances around your hips and your waist (circumferences). These may be compared to each other to help diagnose your condition.  ¨ Your skinfold thickness. Your health care provider may gently pinch a fold of your skin and measure it.  How is this treated?  Treatment for this condition often includes changing your lifestyle. Treatment may include some or all of the following:  · Dietary changes. Work with your health care provider and a dietitian to set a weight-loss goal that is healthy and reasonable for you. Dietary changes may include eating:  ¨ Smaller portions. A portion size is the amount of a particular food that is healthy for you to eat at one time. This varies from person to person.  ¨ Low-calorie or low-fat options.  ¨ More whole grains, fruits, and vegetables.  · Regular physical activity. This may include aerobic activity (cardio) and strength training.  · Medicine to help you lose weight. Your health care provider may prescribe medicine if you are unable to lose 1 pound a week after 6 weeks of eating more healthily and doing more physical activity.  · Surgery. Surgical options may include gastric banding and gastric bypass. Surgery may be done if:  ¨ Other treatments have not helped to improve your condition.  ¨ You have a BMI of 40 or higher.  ¨ You have life-threatening health problems related to obesity.  Follow these instructions at home:     Eating and drinking     · Follow recommendations from your health care provider about what you eat and drink. Your health care provider may advise you to:  ¨ Limit fast foods, sweets, and processed snack foods.  ¨ Choose low-fat options, such as low-fat milk instead of whole milk.  ¨ Eat 5 or more servings of fruits or vegetables every day.  ¨ Eat at home more often. This gives you more control over  what you eat.  ¨ Choose healthy foods when you eat out.  ¨ Learn what a healthy portion size is.  ¨ Keep low-fat snacks on hand.  ¨ Avoid sugary drinks, such as soda, fruit juice, iced tea sweetened with sugar, and flavored milk.  ¨ Eat a healthy breakfast.  · Drink enough water to keep your urine clear or pale yellow.  · Do not go without eating for long periods of time (do not fast) or follow a fad diet. Fasting and fad diets can be unhealthy and even dangerous.  Physical Activity   · Exercise regularly, as told by your health care provider. Ask your health care provider what types of exercise are safe for you and how often you should exercise.  · Warm up and stretch before being active.  · Cool down and stretch after being active.  · Rest between periods of activity.  Lifestyle   · Limit the time that you spend in front of your TV, computer, or video game system.  · Find ways to reward yourself that do not involve food.  · Limit alcohol intake to no more than 1 drink a day for nonpregnant women and 2 drinks a day for men. One drink equals 12 oz of beer, 5 oz of wine, or 1½ oz of hard liquor.  General instructions   · Keep a weight loss journal to keep track of the food you eat and how much you exercise you get.  · Take over-the-counter and prescription medicines only as told by your health care provider.  · Take vitamins and supplements only as told by your health care provider.  · Consider joining a support group. Your health care provider may be able to recommend a support group.  · Keep all follow-up visits as told by your health care provider. This is important.  Contact a health care provider if:  · You are unable to meet your weight loss goal after 6 weeks of dietary and lifestyle changes.  This information is not intended to replace advice given to you by your health care provider. Make sure you discuss any questions you have with your health care provider.  Document Released: 01/25/2006 Document Revised:  05/22/2017 Document Reviewed: 10/05/2016  [employer] Interactive Patient Education © 2017 [employer]      Exercising to Lose Weight  Exercising can help you to lose weight. In order to lose weight through exercise, you need to do vigorous-intensity exercise. You can tell that you are exercising with vigorous intensity if you are breathing very hard and fast and cannot hold a conversation while exercising.  Moderate-intensity exercise helps to maintain your current weight. You can tell that you are exercising at a moderate level if you have a higher heart rate and faster breathing, but you are still able to hold a conversation.  How often should I exercise?  Choose an activity that you enjoy and set realistic goals. Your health care provider can help you to make an activity plan that works for you. Exercise regularly as directed by your health care provider. This may include:  · Doing resistance training twice each week, such as:  ¨ Push-ups.  ¨ Sit-ups.  ¨ Lifting weights.  ¨ Using resistance bands.  · Doing a given intensity of exercise for a given amount of time. Choose from these options:  ¨ 150 minutes of moderate-intensity exercise every week.  ¨ 75 minutes of vigorous-intensity exercise every week.  ¨ A mix of moderate-intensity and vigorous-intensity exercise every week.  Children, pregnant women, people who are out of shape, people who are overweight, and older adults may need to consult a health care provider for individual recommendations. If you have any sort of medical condition, be sure to consult your health care provider before starting a new exercise program.  What are some activities that can help me to lose weight?  · Walking at a rate of at least 4.5 miles an hour.  · Jogging or running at a rate of 5 miles per hour.  · Biking at a rate of at least 10 miles per hour.  · Lap swimming.  · Roller-skating or in-line skating.  · Cross-country skiing.  · Vigorous competitive sports, such as football,  basketball, and soccer.  · Jumping rope.  · Aerobic dancing.  How can I be more active in my day-to-day activities?  · Use the stairs instead of the elevator.  · Take a walk during your lunch break.  · If you drive, park your car farther away from work or school.  · If you take public transportation, get off one stop early and walk the rest of the way.  · Make all of your phone calls while standing up and walking around.  · Get up, stretch, and walk around every 30 minutes throughout the day.  What guidelines should I follow while exercising?  · Do not exercise so much that you hurt yourself, feel dizzy, or get very short of breath.  · Consult your health care provider prior to starting a new exercise program.  · Wear comfortable clothes and shoes with good support.  · Drink plenty of water while you exercise to prevent dehydration or heat stroke. Body water is lost during exercise and must be replaced.  · Work out until you breathe faster and your heart beats faster.  This information is not intended to replace advice given to you by your health care provider. Make sure you discuss any questions you have with your health care provider.  Document Released: 01/20/2012 Document Revised: 05/25/2017 Document Reviewed: 05/21/2015  [employer] Interactive Patient Education © 2017 [employer]      MyPlate from [employer]  The general, healthful diet is based on the 2010 Dietary Guidelines for Americans. The amount of food you need to eat from each food group depends on your age, sex, and level of physical activity and can be individualized by a dietitian. Go to ChooseMyPlate.gov for more information.  What do I need to know about the MyPlate plan?  · Enjoy your food, but eat less.  · Avoid oversized portions.  ¨ ½ of your plate should include fruits and vegetables.  ¨ ¼ of your plate should be grains.  ¨ ¼ of your plate should be protein.  Grains   · Make at least half of your grains whole grains.  · For a 2,000 calorie daily  food plan, eat 6 oz every day.  · 1 oz is about 1 slice bread, 1 cup cereal, or ½ cup cooked rice, cereal, or pasta.  Vegetables   · Make half your plate fruits and vegetables.  · For a 2,000 calorie daily food plan, eat 2½ cups every day.  · 1 cup is about 1 cup raw or cooked vegetables or vegetable juice or 2 cups raw leafy greens.  Fruits   · Make half your plate fruits and vegetables.  · For a 2,000 calorie daily food plan, eat 2 cups every day.  · 1 cup is about 1 cup fruit or 100% fruit juice or ½ cup dried fruit.  Protein   · For a 2,000 calorie daily food plan, eat 5½ oz every day.  · 1 oz is about 1 oz meat, poultry, or fish, ¼ cup cooked beans, 1 egg, 1 Tbsp peanut butter, or ½ oz nuts or seeds.  Dairy   · Switch to fat-free or low-fat (1%) milk.  · For a 2,000 calorie daily food plan, eat 3 cups every day.  · 1 cup is about 1 cup milk or yogurt or soy milk (soy beverage), 1½ oz natural cheese, or 2 oz processed cheese.  Fats, Oils, and Empty Calories   · Only small amounts of oils are recommended.  · Empty calories are calories from solid fats or added sugars.  · Compare sodium in foods like soup, bread, and frozen meals. Choose the foods with lower numbers.  · Drink water instead of sugary drinks.  What foods can I eat?  Grains   Whole grains such as whole wheat, quinoa, millet, and bulgur. Bread, rolls, and pasta made from whole grains. Brown or wild rice. Hot or cold cereals made from whole grains and without added sugar.  Vegetables   All fresh vegetables, especially fresh red, dark green, or orange vegetables. Peas and beans. Low-sodium frozen or canned vegetables prepared without added salt. Low-sodium vegetable juices.  Fruits   All fresh, frozen, and dried fruits. Canned fruit packed in water or fruit juice without added sugar. Fruit juices without added sugar.  Meats and Other Protein Sources   Boiled, baked, or grilled lean meat trimmed of fat. Skinless poultry. Fresh seafood and shellfish.  Canned seafood packed in water. Unsalted nuts and unsalted nut butters. Tofu. Dried beans and pea. Eggs.  Dairy   Low-fat or fat-free milk, yogurt, and cheeses.  Sweets and Desserts   Frozen desserts made from low-fat milk.  Fats and Oils   Olive, peanut, and canola oils and margarine. Salad dressing and mayonnaise made from these oils.  Other   Soups and casseroles made from allowed ingredients and without added fat or salt.  The items listed above may not be a complete list of recommended foods or beverages. Contact your dietitian for more options.   What foods are not recommended?  Grains   Sweetened, low-fiber cereals. Packaged baked goods. Snack crackers and chips. Cheese crackers, butter crackers, and biscuits. Frozen waffles, sweet breads, doughnuts, pastries, packaged baking mixes, pancakes, cakes, and cookies.  Vegetables   Regular canned or frozen vegetables or vegetables prepared with salt. Canned tomatoes. Canned tomato sauce. Fried vegetables. Vegetables in cream sauce or cheese sauce.  Fruits   Fruits packed in syrup or made with added sugar.  Meats and Other Protein Sources   Marbled or fatty meats such as ribs. Poultry with skin. Fried meats, poultry, eggs, or fish. Sausages, hot dogs, and deli meats such as pastrami, bologna, or salami.  Dairy   Whole milk, cream, cheeses made from whole milk, sour cream. Ice cream or yogurt made from whole milk or with added sugar.  Beverages   For adults, no more than one alcoholic drink per day. Regular soft drinks or other sugary beverages. Juice drinks.  Sweets and Desserts   Sugary or fatty desserts, candy, and other sweets.  Fats and Oils   Solid shortening or partially hydrogenated oils. Solid margarine. Margarine that contains trans fats. Butter.  The items listed above may not be a complete list of foods and beverages to avoid. Contact your dietitian for more information.   This information is not intended to replace advice given to you by your health care  provider. Make sure you discuss any questions you have with your health care provider.  Document Released: 01/06/2009 Document Revised: 05/25/2017 Document Reviewed: 11/26/2014  [employer] Interactive Patient Education © 2017 [employer]    Coronary Artery Disease, Male  Coronary artery disease (CAD) is a condition in which the arteries that lead to the heart (coronary arteries) become narrow or blocked. The narrowing or blockage can lead to decreased blood flow to the heart. Prolonged reduced blood flow can cause a heart attack (myocardial infarction or MI). This condition may also be called coronary heart disease.  Because CAD is the leading cause of death in men, it is important to understand what causes this condition and how it is treated.  What are the causes?  CAD is most often caused by atherosclerosis. This is the buildup of fat and cholesterol (plaque) on the inside of the arteries. Over time, the plaque may narrow or block the artery, reducing blood flow to the heart. Plaque can also become weak and break off within a coronary artery and cause a sudden blockage. Other less common causes of CAD include:  · An embolism or blood clot in a coronary artery.  · A tearing of the artery (spontaneous coronary artery dissection).  · An aneurysm.  · Inflammation (vasculitis) in the artery wall.  What increases the risk?  The following factors may make you more likely to develop this condition:  · Age. Men over age 45 are at a greater risk of CAD.  · Family history of CAD.  · Gender. Men often develop CAD earlier in life than women.  · High blood pressure (hypertension).  · Diabetes.  · High cholesterol levels.  · Tobacco use.  · Excessive alcohol use.  · Lack of exercise.  · A diet high in saturated and trans fats, such as fried food and processed meat.  Other possible risk factors include:  · High stress levels.  · Depression.  · Obesity.  · Sleep apnea.  What are the signs or symptoms?  Many people do not have any  symptoms during the early stages of CAD. As the condition progresses, symptoms may include:  · Chest pain (angina). The pain can:  ¨ Feel like a crushing or squeezing, or a tightness, pressure, fullness, or heaviness in the chest.  ¨ Last more than a few minutes or can stop and recur. The pain tends to get worse with exercise or stress and to fade with rest.  · Pain in the arms, neck, jaw, or back.  · Unexplained heartburn or indigestion.  · Shortness of breath.  · Nausea or vomiting.  · Sudden light-headedness.  · Sudden cold sweats.  · Fluttering or fast heartbeat (palpitations).  How is this diagnosed?  This condition is diagnosed based on:  · Your family and medical history.  · A physical exam.  · Tests, including:  ¨ A test to check the electrical signals in your heart (electrocardiogram).  ¨ Exercise stress test. This looks for signs of blockage when the heart is stressed with exercise, such as running on a treadmill.  ¨ Pharmacologic stress test. This test looks for signs of blockage when the heart is being stressed with a medicine.  ¨ Blood tests.  ¨ Coronary angiogram. This is a procedure to look at the coronary arteries to see if there is any blockage. During this test, a dye is injected into your arteries so they appear on an X-ray.  ¨ A test that uses sound waves to take a picture of your heart (echocardiogram).  ¨ Chest X-ray.  How is this treated?  This condition may be treated by:  · Healthy lifestyle changes to reduce risk factors.  · Medicines such as:  ¨ Antiplatelet medicines and blood-thinning medicines, such as aspirin. These help to prevent blood clots.  ¨ Nitroglycerin.  ¨ Blood pressure medicines.  ¨ Cholesterol-lowering medicine.  · Coronary angioplasty and stenting. During this procedure, a thin, flexible tube is inserted through a blood vessel and into a blocked artery. A balloon or similar device on the end of the tube is inflated to open up the artery. In some cases, a small, mesh tube  (stent) is inserted into the artery to keep it open.  · Coronary artery bypass surgery. During this surgery, veins or arteries from other parts of the body are used to create a bypass around the blockage and allow blood to reach your heart.  Follow these instructions at home:  Medicines   · Take over-the-counter and prescription medicines only as told by your health care provider.  · Do not take the following medicines unless your health care provider approves:  ¨ NSAIDs, such as ibuprofen, naproxen, or celecoxib.  ¨ Vitamin supplements that contain vitamin A, vitamin E, or both.  Lifestyle   · Follow an exercise program approved by your health care provider. Aim for 150 minutes of moderate exercise or 75 minutes of vigorous exercise each week.  · Maintain a healthy weight or lose weight as approved by your health care provider.  · Rest when you are tired.  · Learn to manage stress or try to limit your stress. Ask your health care provider for suggestions if you need help.  · Get screened for depression and seek treatment, if needed.  · Do not use any products that contain nicotine or tobacco, such as cigarettes and e-cigarettes. If you need help quitting, ask your health care provider.  · Do not use illegal drugs.  Eating and drinking   · Follow a heart-healthy diet. A dietitian can help educate you about healthy food options and changes. In general, eat plenty of fruits and vegetables, lean meats, and whole grains.  · Avoid foods high in:  ¨ Sugar.  ¨ Salt (sodium).  ¨ Saturated fat, such as processed or fatty meat.  ¨ Trans fat, such as fried foods.  · Use healthy cooking methods such as roasting, grilling, broiling, baking, poaching, steaming, or stir-frying.  · If you drink alcohol, and your health care provider approves, limit your alcohol intake to no more than 2 drinks per day. One drink equals 12 ounces of beer, 5 ounces of wine, or 1½ ounces of hard liquor.  General instructions   · Manage any other health  "conditions, such as hypertension and diabetes. These conditions affect your heart.  · Your health care provider may ask you to monitor your blood pressure. Ideally, your blood pressure should be below 130/80.  · Keep all follow-up visits as told by your health care provider. This is important.  Get help right away if:  · You have pain in your chest, neck, arm, jaw, stomach, or back that:  ¨ Lasts more than a few minutes.  ¨ Is recurring.  ¨ Is not relieved by taking medicine under your tongue (sublingualnitroglycerin).  · You have too much (profuse) sweating without cause.  · You have unexplained:  ¨ Heartburn or indigestion.  ¨ Shortness of breath or difficulty breathing.  ¨ Fluttering or fast heartbeat (palpitations).  ¨ Nausea or vomiting.  ¨ Fatigue.  ¨ Feelings of nervousness or anxiety.  ¨ Weakness.  ¨ Diarrhea.  · You have sudden light-headedness or dizziness.  · You faint.  · You feel like hurting yourself or think about taking your own life.  These symptoms may represent a serious problem that is an emergency. Do not wait to see if the symptoms will go away. Get medical help right away. Call your local emergency services (911 in the [place] Do not drive yourself to the hospital.  Summary  · Coronary artery disease (CAD) is a process in which the arteries that lead to the heart (coronary arteries) become narrow or blocked. The narrowing or blockage can lead to a heart attack.  · Many people do not have any symptoms during the early stages of CAD. This is called \"silent CAD.\"  · CAD can be treated with lifestyle changes, medicines, surgery, or a combination of these treatments.  This information is not intended to replace advice given to you by your health care provider. Make sure you discuss any questions you have with your health care provider.  Document Released: 07/15/2015 Document Revised: 12/08/2017 Document Reviewed: 12/08/2017  [employer] Interactive Patient Education © 2017 [employer]    Steps to " Quit Smoking  Smoking tobacco can be bad for your health. It can also affect almost every organ in your body. Smoking puts you and people around you at risk for many serious long-lasting (chronic) diseases. Quitting smoking is hard, but it is one of the best things that you can do for your health. It is never too late to quit.  What are the benefits of quitting smoking?  When you quit smoking, you lower your risk for getting serious diseases and conditions. They can include:  · Lung cancer or lung disease.  · Heart disease.  · Stroke.  · Heart attack.  · Not being able to have children (infertility).  · Weak bones (osteoporosis) and broken bones (fractures).  If you have coughing, wheezing, and shortness of breath, those symptoms may get better when you quit. You may also get sick less often. If you are pregnant, quitting smoking can help to lower your chances of having a baby of low birth weight.  What can I do to help me quit smoking?  Talk with your doctor about what can help you quit smoking. Some things you can do (strategies) include:  · Quitting smoking totally, instead of slowly cutting back how much you smoke over a period of time.  · Going to in-person counseling. You are more likely to quit if you go to many counseling sessions.  · Using resources and support systems, such as:  ¨ Online chats with a counselor.  ¨ Phone quitlines.  ¨ Printed self-help materials.  ¨ Support groups or group counseling.  ¨ Text messaging programs.  ¨ Mobile phone apps or applications.  · Taking medicines. Some of these medicines may have nicotine in them. If you are pregnant or breastfeeding, do not take any medicines to quit smoking unless your doctor says it is okay. Talk with your doctor about counseling or other things that can help you.  Talk with your doctor about using more than one strategy at the same time, such as taking medicines while you are also going to in-person counseling. This can help make quitting  easier.  What things can I do to make it easier to quit?  Quitting smoking might feel very hard at first, but there is a lot that you can do to make it easier. Take these steps:  · Talk to your family and friends. Ask them to support and encourage you.  · Call phone quitlines, reach out to support groups, or work with a counselor.  · Ask people who smoke to not smoke around you.  · Avoid places that make you want (trigger) to smoke, such as:  ¨ Bars.  ¨ Parties.  ¨ Smoke-break areas at work.  · Spend time with people who do not smoke.  · Lower the stress in your life. Stress can make you want to smoke. Try these things to help your stress:  ¨ Getting regular exercise.  ¨ Deep-breathing exercises.  ¨ Yoga.  ¨ Meditating.  ¨ Doing a body scan. To do this, close your eyes, focus on one area of your body at a time from head to toe, and notice which parts of your body are tense. Try to relax the muscles in those areas.  · Download or buy apps on your mobile phone or tablet that can help you stick to your quit plan. There are many free apps, such as QuitGuide from the CDC (Centers for Disease Control and Prevention). You can find more support from smokefree.gov and other websites.  This information is not intended to replace advice given to you by your health care provider. Make sure you discuss any questions you have with your health care provider.  Document Released: 10/14/2010 Document Revised: 08/15/2017 Document Reviewed: 05/03/2016  Elsevier Interactive Patient Education © 2017 Elsevier Inc.

## 2018-07-10 ENCOUNTER — TELEPHONE (OUTPATIENT)
Dept: CARDIOLOGY | Facility: CLINIC | Age: 58
End: 2018-07-10

## 2018-07-10 DIAGNOSIS — I10 ESSENTIAL HYPERTENSION: Primary | ICD-10-CM

## 2018-07-10 RX ORDER — AMLODIPINE BESYLATE AND BENAZEPRIL HYDROCHLORIDE 5; 10 MG/1; MG/1
1 CAPSULE ORAL DAILY
Qty: 30 CAPSULE | Refills: 11 | Status: SHIPPED | OUTPATIENT
Start: 2018-07-10 | End: 2018-07-16 | Stop reason: DRUGHIGH

## 2018-07-10 RX ORDER — LOSARTAN POTASSIUM 50 MG/1
50 TABLET ORAL DAILY
Qty: 30 TABLET | Refills: 11 | Status: SHIPPED | OUTPATIENT
Start: 2018-07-10 | End: 2019-01-14 | Stop reason: SDUPTHER

## 2018-07-10 RX ORDER — ATENOLOL 25 MG/1
25 TABLET ORAL DAILY
Qty: 30 TABLET | Refills: 11 | Status: SHIPPED | OUTPATIENT
Start: 2018-07-10 | End: 2019-01-14 | Stop reason: SDUPTHER

## 2018-07-16 ENCOUNTER — TELEPHONE (OUTPATIENT)
Dept: CARDIOLOGY | Facility: CLINIC | Age: 58
End: 2018-07-16

## 2018-07-16 RX ORDER — AMLODIPINE BESYLATE 5 MG/1
5 TABLET ORAL DAILY
Qty: 30 TABLET | Refills: 6 | Status: SHIPPED | OUTPATIENT
Start: 2018-07-16 | End: 2018-08-21 | Stop reason: SINTOL

## 2018-07-16 NOTE — TELEPHONE ENCOUNTER
----- Message from Brooke Cho MA sent at 7/16/2018 11:10 AM EDT -----  Dakota from [facility] called stating that the patient is on duplicate medications. He called wanting to verify with us that the patient needs to be on medications. Call back is [phone]    Per [facility] patient to stop Lotrel 5-10 mg and will take Amlodipine 5 mg one tab daily. Dakota, Pharmacist with [facility] aware.

## 2018-08-21 ENCOUNTER — OFFICE VISIT (OUTPATIENT)
Dept: CARDIOLOGY | Facility: CLINIC | Age: 58
End: 2018-08-21

## 2018-08-21 VITALS
DIASTOLIC BLOOD PRESSURE: 98 MMHG | WEIGHT: 249.8 LBS | HEIGHT: 71 IN | HEART RATE: 68 BPM | BODY MASS INDEX: 34.97 KG/M2 | SYSTOLIC BLOOD PRESSURE: 157 MMHG | OXYGEN SATURATION: 98 %

## 2018-08-21 DIAGNOSIS — E78.5 HYPERLIPIDEMIA, UNSPECIFIED HYPERLIPIDEMIA TYPE: ICD-10-CM

## 2018-08-21 DIAGNOSIS — I10 ESSENTIAL HYPERTENSION: ICD-10-CM

## 2018-08-21 DIAGNOSIS — I25.10 CORONARY ARTERY DISEASE INVOLVING NATIVE CORONARY ARTERY OF NATIVE HEART WITHOUT ANGINA PECTORIS: Primary | ICD-10-CM

## 2018-08-21 DIAGNOSIS — Z00.00 HEALTHCARE MAINTENANCE: ICD-10-CM

## 2018-08-21 DIAGNOSIS — F17.200 SMOKING: ICD-10-CM

## 2018-08-21 PROBLEM — IMO0001 SMOKING: Status: ACTIVE | Noted: 2018-08-21

## 2018-08-21 PROCEDURE — 99213 OFFICE O/P EST LOW 20 MIN: CPT | Performed by: NURSE PRACTITIONER

## 2018-08-21 NOTE — PATIENT INSTRUCTIONS
Steps to Quit Smoking  Smoking tobacco can be bad for your health. It can also affect almost every organ in your body. Smoking puts you and people around you at risk for many serious long-lasting (chronic) diseases. Quitting smoking is hard, but it is one of the best things that you can do for your health. It is never too late to quit.  What are the benefits of quitting smoking?  When you quit smoking, you lower your risk for getting serious diseases and conditions. They can include:  · Lung cancer or lung disease.  · Heart disease.  · Stroke.  · Heart attack.  · Not being able to have children (infertility).  · Weak bones (osteoporosis) and broken bones (fractures).    If you have coughing, wheezing, and shortness of breath, those symptoms may get better when you quit. You may also get sick less often. If you are pregnant, quitting smoking can help to lower your chances of having a baby of low birth weight.  What can I do to help me quit smoking?  Talk with your doctor about what can help you quit smoking. Some things you can do (strategies) include:  · Quitting smoking totally, instead of slowly cutting back how much you smoke over a period of time.  · Going to in-person counseling. You are more likely to quit if you go to many counseling sessions.  · Using resources and support systems, such as:  ? Online chats with a counselor.  ? Phone quitlines.  ? Printed self-help materials.  ? Support groups or group counseling.  ? Text messaging programs.  ? Mobile phone apps or applications.  · Taking medicines. Some of these medicines may have nicotine in them. If you are pregnant or breastfeeding, do not take any medicines to quit smoking unless your doctor says it is okay. Talk with your doctor about counseling or other things that can help you.    Talk with your doctor about using more than one strategy at the same time, such as taking medicines while you are also going to in-person counseling. This can help make  quitting easier.  What things can I do to make it easier to quit?  Quitting smoking might feel very hard at first, but there is a lot that you can do to make it easier. Take these steps:  · Talk to your family and friends. Ask them to support and encourage you.  · Call phone quitlines, reach out to support groups, or work with a counselor.  · Ask people who smoke to not smoke around you.  · Avoid places that make you want (trigger) to smoke, such as:  ? Bars.  ? Parties.  ? Smoke-break areas at work.  · Spend time with people who do not smoke.  · Lower the stress in your life. Stress can make you want to smoke. Try these things to help your stress:  ? Getting regular exercise.  ? Deep-breathing exercises.  ? Yoga.  ? Meditating.  ? Doing a body scan. To do this, close your eyes, focus on one area of your body at a time from head to toe, and notice which parts of your body are tense. Try to relax the muscles in those areas.  · Download or buy apps on your mobile phone or tablet that can help you stick to your quit plan. There are many free apps, such as QuitGuide from the CDC (Centers for Disease Control and Prevention). You can find more support from smokefree.gov and other websites.    This information is not intended to replace advice given to you by your health care provider. Make sure you discuss any questions you have with your health care provider.  Document Released: 10/14/2010 Document Revised: 08/15/2017 Document Reviewed: 05/03/2016  [employer] Interactive Patient Education © 2018 [employer]  Fat and Cholesterol Restricted Diet  Getting too much fat and cholesterol in your diet may cause health problems. Following this diet helps keep your fat and cholesterol at normal levels. This can keep you from getting sick.  What types of fat should I choose?  · Choose monosaturated and polyunsaturated fats. These are found in foods such as olive oil, canola oil, flaxseeds, walnuts, almonds, and seeds.  · Eat more  "omega-3 fats. Good choices include salmon, mackerel, sardines, tuna, flaxseed oil, and ground flaxseeds.  · Limit saturated fats. These are in animal products such as meats, butter, and cream. They can also be in plant products such as palm oil, palm kernel oil, and coconut oil.  · Avoid foods with partially hydrogenated oils in them. These contain trans fats. Examples of foods that have trans fats are stick margarine, some tub margarines, cookies, crackers, and other baked goods.  What general guidelines do I need to follow?  · Check food labels. Look for the words \"trans fat\" and \"saturated fat.\"  · When preparing a meal:  ? Fill half of your plate with vegetables and green salads.  ? Fill one fourth of your plate with whole grains. Look for the word \"whole\" as the first word in the ingredient list.  ? Fill one fourth of your plate with lean protein foods.  · Eat more foods that have fiber, like apples, carrots, beans, peas, and barley.  · Eat more home-cooked foods. Eat less at restaurants and buffets.  · Limit or avoid alcohol.  · Limit foods high in starch and sugar.  · Limit fried foods.  · Cook foods without frying them. Baking, boiling, grilling, and broiling are all great options.  · Lose weight if you are overweight. Losing even a small amount of weight can help your overall health. It can also help prevent diseases such as diabetes and heart disease.  What foods can I eat?  Grains  Whole grains, such as whole wheat or whole grain breads, crackers, cereals, and pasta. Unsweetened oatmeal, bulgur, barley, quinoa, or brown rice. Corn or whole wheat flour tortillas.  Vegetables  Fresh or frozen vegetables (raw, steamed, roasted, or grilled). Green salads.  Fruits  All fresh, canned (in natural juice), or frozen fruits.  Meat and Other Protein Products  Ground beef (85% or leaner), grass-fed beef, or beef trimmed of fat. Skinless chicken or turkey. Ground chicken or turkey. Pork trimmed of fat. All fish and " seafood. Eggs. Dried beans, peas, or lentils. Unsalted nuts or seeds. Unsalted canned or dry beans.  Dairy  Low-fat dairy products, such as skim or 1% milk, 2% or reduced-fat cheeses, low-fat ricotta or cottage cheese, or plain low-fat yogurt.  Fats and Oils  Tub margarines without trans fats. Light or reduced-fat mayonnaise and salad dressings. Avocado. Olive, canola, sesame, or safflower oils. Natural peanut or almond butter (choose ones without added sugar and oil).  The items listed above may not be a complete list of recommended foods or beverages. Contact your dietitian for more options.  What foods are not recommended?  Grains  White bread. White pasta. White rice. Cornbread. Bagels, pastries, and croissants. Crackers that contain trans fat.  Vegetables  White potatoes. Corn. Creamed or fried vegetables. Vegetables in a cheese sauce.  Fruits  Dried fruits. Canned fruit in light or heavy syrup. Fruit juice.  Meat and Other Protein Products  Fatty cuts of meat. Ribs, chicken wings, sanon, sausage, bologna, salami, chitterlings, fatback, hot dogs, bratwurst, and packaged luncheon meats. Liver and organ meats.  Dairy  Whole or 2% milk, cream, half-and-half, and cream cheese. Whole milk cheeses. Whole-fat or sweetened yogurt. Full-fat cheeses. Nondairy creamers and whipped toppings. Processed cheese, cheese spreads, or cheese curds.  Sweets and Desserts  Corn syrup, sugars, honey, and molasses. Candy. Jam and jelly. Syrup. Sweetened cereals. Cookies, pies, cakes, donuts, muffins, and ice cream.  Fats and Oils  Butter, stick margarine, lard, shortening, ghee, or sanon fat. Coconut, palm kernel, or palm oils.  Beverages  Alcohol. Sweetened drinks (such as sodas, lemonade, and fruit drinks or punches).  The items listed above may not be a complete list of foods and beverages to avoid. Contact your dietitian for more information.  This information is not intended to replace advice given to you by your health care  provider. Make sure you discuss any questions you have with your health care provider.  Document Released: 06/18/2013 Document Revised: 08/24/2017 Document Reviewed: 03/19/2015  [employer] Interactive Patient Education © 2018 [employer]  BMI for Adults  Body mass index (BMI) is a number that is calculated from a person's weight and height. In most adults, the number is used to find how much of an adult's weight is made up of fat. BMI is not as accurate as a direct measure of body fat.  How is BMI calculated?  BMI is calculated by dividing weight in kilograms by height in meters squared. It can also be calculated by dividing weight in pounds by height in inches squared, then multiplying the resulting number by 703. Charts are available to help you find your BMI quickly and easily without doing this calculation.  How is BMI interpreted?  Health care professionals use BMI charts to identify whether an adult is underweight, at a normal weight, or overweight based on the following guidelines:  · Underweight: BMI less than 18.5.  · Normal weight: BMI between 18.5 and 24.9.  · Overweight: BMI between 25 and 29.9.  · Obese: BMI of 30 and above.    BMI is usually interpreted the same for males and females.  Weight includes both fat and muscle, so someone with a muscular build, such as an athlete, may have a BMI that is higher than 24.9. In cases like these, BMI may not accurately depict body fat. To determine if excess body fat is the cause of a BMI of 25 or higher, further assessments may need to be done by a health care provider.  Why is BMI a useful tool?  BMI is used to identify a possible weight problem that may be related to a medical problem or may increase the risk for medical problems. BMI can also be used to promote changes to reach a healthy weight.  This information is not intended to replace advice given to you by your health care provider. Make sure you discuss any questions you have with your health care  provider.  Document Released: 08/29/2005 Document Revised: 04/27/2017 Document Reviewed: 05/15/2015  [employer] Interactive Patient Education © 2018 Elsevier Inc.

## 2018-08-21 NOTE — PROGRESS NOTES
Subjective   Herber Stein is a 58 y.o. male     Chief Complaint   Patient presents with   • Follow-up     Patient presents for 6 month follow up.    • Coronary Artery Disease       HPI    Problem List:    1.) CAD status post stenting to RCA in 2012 with bare metal stent   a. Stress in 2014 showing posterolateral infarct with minimal martine-infarct ischemia.    b. Preserved Systolic fxn  2.) HTN  3.) Diabetes mellitus  4.) Shortness of breath  5.) Normal LV systolic function with EF 60%  5.1) Echo 11/10/14 - mild LVH; EF 50-55%; DD I; basal inferolateral wall segment is hypokinetic, trace AR, TR, MR and WY  6) Smoking Habituation, Cigars    Patient is a 58-year-old male who presents today for follow-up.  He denies any chest pain, pressure, palpitations, fluttering, dizziness, presyncope, syncope, orthopnea, PND or edema.  He says when he was on amlodipine his blood pressure was going to low and he will get lightheaded since he stopped that he some better.  He says that he will get short of breath but only when he does more than normal.  He says otherwise he does fine.  PCP does monitor his cholesterol.  He does smoke cigars every now and then.    Current Outpatient Prescriptions   Medication Sig Dispense Refill   • aspirin (ASPIRIN LOW DOSE) 81 MG EC tablet Take 81 mg by mouth Daily.     • atenolol (TENORMIN) 25 MG tablet Take 1 tablet by mouth Daily. 30 tablet 11   • atorvastatin (LIPITOR) 40 MG tablet Take 40 mg by mouth Daily.     • cetirizine (ZyrTEC) 10 MG tablet Take 10 mg by mouth Daily.     • clopidogrel (PLAVIX) 75 MG tablet Take 1 tablet by mouth Daily. 90 tablet 3   • Dapagliflozin Propanediol (FARXIGA) 10 MG tablet Take 10 mg by mouth Daily.     • losartan (COZAAR) 50 MG tablet Take 1 tablet by mouth Daily. (Patient taking differently: Take 50 mg by mouth 2 (Two) Times a Day.) 30 tablet 11   • metFORMIN (GLUCOPHAGE) 500 MG tablet Take 500 mg by mouth 2 (Two) Times a Day. 2 tablets twice a day     •  "nitroglycerin (NITROSTAT) 0.4 MG SL tablet Place  under the tongue.     • Omega-3 Fatty Acids (FISH OIL) 1000 MG capsule capsule Take 1,000 mg by mouth Daily.       No current facility-administered medications for this visit.        ALLERGIES    Patient has no known allergies.    Past Medical History:   Diagnosis Date   • Arthritis    • Atherosclerotic heart disease of native coronary artery without angina pectoris    • Chest pain 8/31/2016   • Diabetes mellitus (CMS/HCC) 8/31/2016   • Heart disease 8/31/2016   • HTN (hypertension) 8/31/2016   • Hyperlipidemia    • Migraine headache    • Obesity 8/31/2016   • Sleep apnea    • Umbilical hernia        Social History     Social History   • Marital status: Single     Spouse name: N/A   • Number of children: N/A   • Years of education: N/A     Occupational History   • Not on file.     Social History Main Topics   • Smoking status: Current Some Day Smoker     Types: Cigars   • Smokeless tobacco: Never Used   • Alcohol use Yes      Comment: 6 pack per month   • Drug use: No   • Sexual activity: Defer     Other Topics Concern   • Not on file     Social History Narrative   • No narrative on file       Family History   Problem Relation Age of Onset   • Stroke Mother    • Diabetes Mother    • Heart attack Mother    • Other Mother         CABG, Arotic Valve Replacement   • Hypertension Father    • Heart attack Brother        Review of Systems   Constitutional: Positive for fatigue (\"If i do alot at once\"). Negative for chills, diaphoresis and fever.   HENT: Positive for congestion (Nasal). Negative for rhinorrhea and sneezing.    Eyes: Positive for visual disturbance (Wears glasses. ).   Respiratory: Positive for shortness of breath (if he does more than normal). Negative for chest tightness.    Cardiovascular: Negative for chest pain, palpitations and leg swelling.   Gastrointestinal: Positive for abdominal pain (\"some since hernia surgery\"). Negative for blood in stool, " "constipation, diarrhea, nausea and vomiting.   Endocrine: Positive for heat intolerance. Negative for cold intolerance.   Genitourinary: Negative for difficulty urinating and hematuria.   Musculoskeletal: Positive for arthralgias, back pain and myalgias. Negative for neck pain.   Skin: Negative.    Allergic/Immunologic: Positive for environmental allergies (Seasonal).   Neurological: Positive for light-headedness (From Amlodipine, has improved since stopping). Negative for dizziness, syncope, weakness and headaches.   Hematological: Bruises/bleeds easily (Bruise).   Psychiatric/Behavioral: Negative for sleep disturbance (Denies waking at night SOA).       Objective   /98 (BP Location: Left arm, Patient Position: Sitting)   Pulse 68   Ht 180.3 cm (71\")   Wt 113 kg (249 lb 12.8 oz)   SpO2 98%   BMI 34.84 kg/m²   Vitals:    08/21/18 1019   BP: 157/98   BP Location: Left arm   Patient Position: Sitting   Pulse: 68   SpO2: 98%   Weight: 113 kg (249 lb 12.8 oz)   Height: 180.3 cm (71\")      Lab Results (most recent)     None        Physical Exam   Constitutional: He is oriented to person, place, and time. Vital signs are normal. He appears well-developed and well-nourished. He is active and cooperative.   HENT:   Head: Normocephalic.   Mouth/Throat: Abnormal dentition (poor ).   Eyes: Lids are normal.   Wears glasses    Neck: Normal carotid pulses, no hepatojugular reflux and no JVD present. Carotid bruit is not present.   Cardiovascular: Normal rate, regular rhythm and normal heart sounds.    Pulses:       Radial pulses are 2+ on the right side, and 2+ on the left side.        Dorsalis pedis pulses are 2+ on the right side, and 2+ on the left side.        Posterior tibial pulses are 2+ on the right side, and 2+ on the left side.   Trace edema BLE.    Pulmonary/Chest: Effort normal and breath sounds normal.   Abdominal: Normal appearance and bowel sounds are normal.   Neurological: He is alert and oriented to " person, place, and time.   Skin: Skin is warm, dry and intact.   Psychiatric: He has a normal mood and affect. His speech is normal and behavior is normal. Judgment and thought content normal. Cognition and memory are normal.       Procedure   Procedures         Assessment/Plan      Diagnosis Plan   1. Coronary artery disease involving native coronary artery of native heart without angina pectoris     2. Essential hypertension     3. Hyperlipidemia, unspecified hyperlipidemia type     4. Smoking         Return in about 6 months (around 2/21/2019).       CAD-patient is on aspirin, statin and beta.  Hypertension-slightly elevated today however he says it's been doing very good.  Hyperlipidemia-patient is on Lipitor monitor by PCP.  Smoking-patient encouraged on cessation.  He will continue his medication regimen.  He'll follow-up in 6 months or sooner if any changes.    I advised Herber of the risks of continuing to use tobacco, and I provided him with tobacco cessation educational materials in the After Visit Summary.     During this visit, I spent less than 3 minutes counseling the patient regarding tobacco cessation.    Patient's Body mass index is 34.84 kg/m². BMI is above normal parameters. Recommendations include: educational material.      Electronically signed by:

## 2018-11-13 DIAGNOSIS — I25.10 CORONARY ARTERY DISEASE INVOLVING NATIVE CORONARY ARTERY OF NATIVE HEART WITHOUT ANGINA PECTORIS: ICD-10-CM

## 2018-11-13 RX ORDER — ASPIRIN 81 MG/1
81 TABLET ORAL DAILY
Qty: 30 TABLET | Refills: 11 | Status: SHIPPED | OUTPATIENT
Start: 2018-11-13 | End: 2019-11-07 | Stop reason: SDUPTHER

## 2018-11-13 RX ORDER — CLOPIDOGREL BISULFATE 75 MG/1
75 TABLET ORAL DAILY
Qty: 90 TABLET | Refills: 3 | Status: SHIPPED | OUTPATIENT
Start: 2018-11-13 | End: 2019-08-21 | Stop reason: SDUPTHER

## 2019-01-14 ENCOUNTER — TELEPHONE (OUTPATIENT)
Dept: CARDIOLOGY | Facility: CLINIC | Age: 59
End: 2019-01-14

## 2019-01-14 DIAGNOSIS — I10 ESSENTIAL HYPERTENSION: ICD-10-CM

## 2019-01-14 RX ORDER — ATENOLOL 25 MG/1
25 TABLET ORAL DAILY
Qty: 30 TABLET | Refills: 11 | Status: SHIPPED | OUTPATIENT
Start: 2019-01-14 | End: 2019-02-05 | Stop reason: SDUPTHER

## 2019-01-14 RX ORDER — LOSARTAN POTASSIUM 50 MG/1
50 TABLET ORAL 2 TIMES DAILY
Qty: 60 TABLET | Refills: 11 | Status: SHIPPED | OUTPATIENT
Start: 2019-01-14 | End: 2019-02-05 | Stop reason: SDUPTHER

## 2019-02-05 ENCOUNTER — TELEPHONE (OUTPATIENT)
Dept: CARDIOLOGY | Facility: CLINIC | Age: 59
End: 2019-02-05

## 2019-02-05 DIAGNOSIS — I10 ESSENTIAL HYPERTENSION: ICD-10-CM

## 2019-02-05 RX ORDER — ATENOLOL 25 MG/1
25 TABLET ORAL DAILY
Qty: 30 TABLET | Refills: 5 | Status: SHIPPED | OUTPATIENT
Start: 2019-02-05 | End: 2019-08-21 | Stop reason: SDUPTHER

## 2019-02-05 RX ORDER — LOSARTAN POTASSIUM 50 MG/1
50 TABLET ORAL 2 TIMES DAILY
Qty: 60 TABLET | Refills: 5 | Status: SHIPPED | OUTPATIENT
Start: 2019-02-05 | End: 2019-08-21

## 2019-02-05 NOTE — TELEPHONE ENCOUNTER
B/P MEDS REFILLS ELECTRO'D TO [facility] AS REQUESTED. PH,LPN      ----- Message from Tika Whitlock MA sent at 2/5/2019  5:22 PM EST -----  Request refill of his BP meds to [facility].

## 2019-02-07 ENCOUNTER — TELEPHONE (OUTPATIENT)
Dept: CARDIOLOGY | Facility: CLINIC | Age: 59
End: 2019-02-07

## 2019-02-07 NOTE — TELEPHONE ENCOUNTER
----- Message from Vania Green sent at 2/7/2019  2:17 PM EST -----  Regarding: AMLODIPINE  Contact: [phone]  PT CALLED AND STATED THAT WHEN HE WENT TO GET HIS AMLODIPINE THE PHARMACY SAID THAT HE SHOULD NOT BE TAKING IT.

## 2019-02-07 NOTE — TELEPHONE ENCOUNTER
Pharmacy had l/m requesting refills on Amlodipine. Per office note in Aug. 2018, Amlodipine was dc'd due to patient reporting low b/p, gets dizzy and lightheaded and felt better since stopped it. [facility] aware, to let patient know. YESSICA,LPN

## 2019-02-07 NOTE — TELEPHONE ENCOUNTER
"Per chart review, Pat called px today:  \"Pharmacy had l/m requesting refills on Amlodipine. Per office note in Aug. 2018, Amlodipine was dc'd due to patient reporting low b/p, gets dizzy and lightheaded and felt better since stopped it. [facility] aware, to let patient know. YESSICA,LPN\"    Last OV note states:   \"He says when he was on amlodipine his blood pressure was going to low and he will get lightheaded since he stopped that he some better.\"          Informed pt of the above, he verbalized understanding. Stated he gets confused w/ so many medications.               "

## 2019-02-21 ENCOUNTER — OFFICE VISIT (OUTPATIENT)
Dept: CARDIOLOGY | Facility: CLINIC | Age: 59
End: 2019-02-21

## 2019-02-21 VITALS
DIASTOLIC BLOOD PRESSURE: 82 MMHG | HEART RATE: 73 BPM | OXYGEN SATURATION: 97 % | HEIGHT: 71 IN | SYSTOLIC BLOOD PRESSURE: 141 MMHG | BODY MASS INDEX: 33.91 KG/M2 | WEIGHT: 242.2 LBS

## 2019-02-21 DIAGNOSIS — E78.5 HYPERLIPIDEMIA, UNSPECIFIED HYPERLIPIDEMIA TYPE: ICD-10-CM

## 2019-02-21 DIAGNOSIS — F17.200 SMOKING: ICD-10-CM

## 2019-02-21 DIAGNOSIS — I25.10 CORONARY ARTERY DISEASE INVOLVING NATIVE CORONARY ARTERY OF NATIVE HEART WITHOUT ANGINA PECTORIS: Primary | ICD-10-CM

## 2019-02-21 DIAGNOSIS — I10 ESSENTIAL HYPERTENSION: ICD-10-CM

## 2019-02-21 PROCEDURE — 93000 ELECTROCARDIOGRAM COMPLETE: CPT | Performed by: NURSE PRACTITIONER

## 2019-02-21 PROCEDURE — 99213 OFFICE O/P EST LOW 20 MIN: CPT | Performed by: NURSE PRACTITIONER

## 2019-02-21 RX ORDER — AMLODIPINE BESYLATE AND BENAZEPRIL HYDROCHLORIDE 5; 10 MG/1; MG/1
CAPSULE ORAL DAILY
Refills: 0 | COMMUNITY
Start: 2019-01-25 | End: 2019-08-21 | Stop reason: DRUGHIGH

## 2019-02-21 NOTE — PATIENT INSTRUCTIONS
Steps to Quit Smoking  Smoking tobacco can be bad for your health. It can also affect almost every organ in your body. Smoking puts you and people around you at risk for many serious long-lasting (chronic) diseases. Quitting smoking is hard, but it is one of the best things that you can do for your health. It is never too late to quit.  What are the benefits of quitting smoking?  When you quit smoking, you lower your risk for getting serious diseases and conditions. They can include:  · Lung cancer or lung disease.  · Heart disease.  · Stroke.  · Heart attack.  · Not being able to have children (infertility).  · Weak bones (osteoporosis) and broken bones (fractures).    If you have coughing, wheezing, and shortness of breath, those symptoms may get better when you quit. You may also get sick less often. If you are pregnant, quitting smoking can help to lower your chances of having a baby of low birth weight.  What can I do to help me quit smoking?  Talk with your doctor about what can help you quit smoking. Some things you can do (strategies) include:  · Quitting smoking totally, instead of slowly cutting back how much you smoke over a period of time.  · Going to in-person counseling. You are more likely to quit if you go to many counseling sessions.  · Using resources and support systems, such as:  ? Online chats with a counselor.  ? Phone quitlines.  ? Printed self-help materials.  ? Support groups or group counseling.  ? Text messaging programs.  ? Mobile phone apps or applications.  · Taking medicines. Some of these medicines may have nicotine in them. If you are pregnant or breastfeeding, do not take any medicines to quit smoking unless your doctor says it is okay. Talk with your doctor about counseling or other things that can help you.    Talk with your doctor about using more than one strategy at the same time, such as taking medicines while you are also going to in-person counseling. This can help make  quitting easier.  What things can I do to make it easier to quit?  Quitting smoking might feel very hard at first, but there is a lot that you can do to make it easier. Take these steps:  · Talk to your family and friends. Ask them to support and encourage you.  · Call phone quitlines, reach out to support groups, or work with a counselor.  · Ask people who smoke to not smoke around you.  · Avoid places that make you want (trigger) to smoke, such as:  ? Bars.  ? Parties.  ? Smoke-break areas at work.  · Spend time with people who do not smoke.  · Lower the stress in your life. Stress can make you want to smoke. Try these things to help your stress:  ? Getting regular exercise.  ? Deep-breathing exercises.  ? Yoga.  ? Meditating.  ? Doing a body scan. To do this, close your eyes, focus on one area of your body at a time from head to toe, and notice which parts of your body are tense. Try to relax the muscles in those areas.  · Download or buy apps on your mobile phone or tablet that can help you stick to your quit plan. There are many free apps, such as QuitGuide from the CDC (Centers for Disease Control and Prevention). You can find more support from smokefree.gov and other websites.    This information is not intended to replace advice given to you by your health care provider. Make sure you discuss any questions you have with your health care provider.  Document Released: 10/14/2010 Document Revised: 08/15/2017 Document Reviewed: 05/03/2016  [employer] Interactive Patient Education © 2018 [employer]  Fat and Cholesterol Restricted Diet  Getting too much fat and cholesterol in your diet may cause health problems. Following this diet helps keep your fat and cholesterol at normal levels. This can keep you from getting sick.  What types of fat should I choose?  · Choose monosaturated and polyunsaturated fats. These are found in foods such as olive oil, canola oil, flaxseeds, walnuts, almonds, and seeds.  · Eat more  "omega-3 fats. Good choices include salmon, mackerel, sardines, tuna, flaxseed oil, and ground flaxseeds.  · Limit saturated fats. These are in animal products such as meats, butter, and cream. They can also be in plant products such as palm oil, palm kernel oil, and coconut oil.  · Avoid foods with partially hydrogenated oils in them. These contain trans fats. Examples of foods that have trans fats are stick margarine, some tub margarines, cookies, crackers, and other baked goods.  What general guidelines do I need to follow?  · Check food labels. Look for the words \"trans fat\" and \"saturated fat.\"  · When preparing a meal:  ? Fill half of your plate with vegetables and green salads.  ? Fill one fourth of your plate with whole grains. Look for the word \"whole\" as the first word in the ingredient list.  ? Fill one fourth of your plate with lean protein foods.  · Eat more foods that have fiber, like apples, carrots, beans, peas, and barley.  · Eat more home-cooked foods. Eat less at restaurants and buffets.  · Limit or avoid alcohol.  · Limit foods high in starch and sugar.  · Limit fried foods.  · Cook foods without frying them. Baking, boiling, grilling, and broiling are all great options.  · Lose weight if you are overweight. Losing even a small amount of weight can help your overall health. It can also help prevent diseases such as diabetes and heart disease.  What foods can I eat?  Grains  Whole grains, such as whole wheat or whole grain breads, crackers, cereals, and pasta. Unsweetened oatmeal, bulgur, barley, quinoa, or brown rice. Corn or whole wheat flour tortillas.  Vegetables  Fresh or frozen vegetables (raw, steamed, roasted, or grilled). Green salads.  Fruits  All fresh, canned (in natural juice), or frozen fruits.  Meat and Other Protein Products  Ground beef (85% or leaner), grass-fed beef, or beef trimmed of fat. Skinless chicken or turkey. Ground chicken or turkey. Pork trimmed of fat. All fish and " seafood. Eggs. Dried beans, peas, or lentils. Unsalted nuts or seeds. Unsalted canned or dry beans.  Dairy  Low-fat dairy products, such as skim or 1% milk, 2% or reduced-fat cheeses, low-fat ricotta or cottage cheese, or plain low-fat yogurt.  Fats and Oils  Tub margarines without trans fats. Light or reduced-fat mayonnaise and salad dressings. Avocado. Olive, canola, sesame, or safflower oils. Natural peanut or almond butter (choose ones without added sugar and oil).  The items listed above may not be a complete list of recommended foods or beverages. Contact your dietitian for more options.  What foods are not recommended?  Grains  White bread. White pasta. White rice. Cornbread. Bagels, pastries, and croissants. Crackers that contain trans fat.  Vegetables  White potatoes. Corn. Creamed or fried vegetables. Vegetables in a cheese sauce.  Fruits  Dried fruits. Canned fruit in light or heavy syrup. Fruit juice.  Meat and Other Protein Products  Fatty cuts of meat. Ribs, chicken wings, sanon, sausage, bologna, salami, chitterlings, fatback, hot dogs, bratwurst, and packaged luncheon meats. Liver and organ meats.  Dairy  Whole or 2% milk, cream, half-and-half, and cream cheese. Whole milk cheeses. Whole-fat or sweetened yogurt. Full-fat cheeses. Nondairy creamers and whipped toppings. Processed cheese, cheese spreads, or cheese curds.  Sweets and Desserts  Corn syrup, sugars, honey, and molasses. Candy. Jam and jelly. Syrup. Sweetened cereals. Cookies, pies, cakes, donuts, muffins, and ice cream.  Fats and Oils  Butter, stick margarine, lard, shortening, ghee, or sanon fat. Coconut, palm kernel, or palm oils.  Beverages  Alcohol. Sweetened drinks (such as sodas, lemonade, and fruit drinks or punches).  The items listed above may not be a complete list of foods and beverages to avoid. Contact your dietitian for more information.  This information is not intended to replace advice given to you by your health care  provider. Make sure you discuss any questions you have with your health care provider.  Document Released: 06/18/2013 Document Revised: 08/24/2017 Document Reviewed: 03/19/2015  [employer] Interactive Patient Education © 2018 [employer]  BMI for Adults  Body mass index (BMI) is a number that is calculated from a person's weight and height. In most adults, the number is used to find how much of an adult's weight is made up of fat. BMI is not as accurate as a direct measure of body fat.  How is BMI calculated?  BMI is calculated by dividing weight in kilograms by height in meters squared. It can also be calculated by dividing weight in pounds by height in inches squared, then multiplying the resulting number by 703. Charts are available to help you find your BMI quickly and easily without doing this calculation.  How is BMI interpreted?  Health care professionals use BMI charts to identify whether an adult is underweight, at a normal weight, or overweight based on the following guidelines:  · Underweight: BMI less than 18.5.  · Normal weight: BMI between 18.5 and 24.9.  · Overweight: BMI between 25 and 29.9.  · Obese: BMI of 30 and above.    BMI is usually interpreted the same for males and females.  Weight includes both fat and muscle, so someone with a muscular build, such as an athlete, may have a BMI that is higher than 24.9. In cases like these, BMI may not accurately depict body fat. To determine if excess body fat is the cause of a BMI of 25 or higher, further assessments may need to be done by a health care provider.  Why is BMI a useful tool?  BMI is used to identify a possible weight problem that may be related to a medical problem or may increase the risk for medical problems. BMI can also be used to promote changes to reach a healthy weight.  This information is not intended to replace advice given to you by your health care provider. Make sure you discuss any questions you have with your health care  "provider.  Document Released: 08/29/2005 Document Revised: 04/27/2017 Document Reviewed: 05/15/2015  [employer] Interactive Patient Education © 2018 [employer]    Hypertension  Hypertension, commonly called high blood pressure, is when the force of blood pumping through the arteries is too strong. The arteries are the blood vessels that carry blood from the heart throughout the body. Hypertension forces the heart to work harder to pump blood and may cause arteries to become narrow or stiff. Having untreated or uncontrolled hypertension can cause heart attacks, strokes, kidney disease, and other problems.  A blood pressure reading consists of a higher number over a lower number. Ideally, your blood pressure should be below 120/80. The first (\"top\") number is called the systolic pressure. It is a measure of the pressure in your arteries as your heart beats. The second (\"bottom\") number is called the diastolic pressure. It is a measure of the pressure in your arteries as the heart relaxes.  What are the causes?  The cause of this condition is not known.  What increases the risk?  Some risk factors for high blood pressure are under your control. Others are not.  Factors you can change  · Smoking.  · Having type 2 diabetes mellitus, high cholesterol, or both.  · Not getting enough exercise or physical activity.  · Being overweight.  · Having too much fat, sugar, calories, or salt (sodium) in your diet.  · Drinking too much alcohol.  Factors that are difficult or impossible to change  · Having chronic kidney disease.  · Having a family history of high blood pressure.  · Age. Risk increases with age.  · Race. You may be at higher risk if you are -American.  · Gender. Men are at higher risk than women before age 45. After age 65, women are at higher risk than men.  · Having obstructive sleep apnea.  · Stress.  What are the signs or symptoms?  Extremely high blood pressure (hypertensive crisis) may " cause:  · Headache.  · Anxiety.  · Shortness of breath.  · Nosebleed.  · Nausea and vomiting.  · Severe chest pain.  · Jerky movements you cannot control (seizures).    How is this diagnosed?  This condition is diagnosed by measuring your blood pressure while you are seated, with your arm resting on a surface. The cuff of the blood pressure monitor will be placed directly against the skin of your upper arm at the level of your heart. It should be measured at least twice using the same arm. Certain conditions can cause a difference in blood pressure between your right and left arms.  Certain factors can cause blood pressure readings to be lower or higher than normal (elevated) for a short period of time:  · When your blood pressure is higher when you are in a health care provider's office than when you are at home, this is called white coat hypertension. Most people with this condition do not need medicines.  · When your blood pressure is higher at home than when you are in a health care provider's office, this is called masked hypertension. Most people with this condition may need medicines to control blood pressure.    If you have a high blood pressure reading during one visit or you have normal blood pressure with other risk factors:  · You may be asked to return on a different day to have your blood pressure checked again.  · You may be asked to monitor your blood pressure at home for 1 week or longer.    If you are diagnosed with hypertension, you may have other blood or imaging tests to help your health care provider understand your overall risk for other conditions.  How is this treated?  This condition is treated by making healthy lifestyle changes, such as eating healthy foods, exercising more, and reducing your alcohol intake. Your health care provider may prescribe medicine if lifestyle changes are not enough to get your blood pressure under control, and if:  · Your systolic blood pressure is above  130.  · Your diastolic blood pressure is above 80.    Your personal target blood pressure may vary depending on your medical conditions, your age, and other factors.  Follow these instructions at home:  Eating and drinking  · Eat a diet that is high in fiber and potassium, and low in sodium, added sugar, and fat. An example eating plan is called the DASH (Dietary Approaches to Stop Hypertension) diet. To eat this way:  ? Eat plenty of fresh fruits and vegetables. Try to fill half of your plate at each meal with fruits and vegetables.  ? Eat whole grains, such as whole wheat pasta, brown rice, or whole grain bread. Fill about one quarter of your plate with whole grains.  ? Eat or drink low-fat dairy products, such as skim milk or low-fat yogurt.  ? Avoid fatty cuts of meat, processed or cured meats, and poultry with skin. Fill about one quarter of your plate with lean proteins, such as fish, chicken without skin, beans, eggs, and tofu.  ? Avoid premade and processed foods. These tend to be higher in sodium, added sugar, and fat.  · Reduce your daily sodium intake. Most people with hypertension should eat less than 1,500 mg of sodium a day.  · Limit alcohol intake to no more than 1 drink a day for nonpregnant women and 2 drinks a day for men. One drink equals 12 oz of beer, 5 oz of wine, or 1½ oz of hard liquor.  Lifestyle  · Work with your health care provider to maintain a healthy body weight or to lose weight. Ask what an ideal weight is for you.  · Get at least 30 minutes of exercise that causes your heart to beat faster (aerobic exercise) most days of the week. Activities may include walking, swimming, or biking.  · Include exercise to strengthen your muscles (resistance exercise), such as pilates or lifting weights, as part of your weekly exercise routine. Try to do these types of exercises for 30 minutes at least 3 days a week.  · Do not use any products that contain nicotine or tobacco, such as cigarettes and  e-cigarettes. If you need help quitting, ask your health care provider.  · Monitor your blood pressure at home as told by your health care provider.  · Keep all follow-up visits as told by your health care provider. This is important.  Medicines  · Take over-the-counter and prescription medicines only as told by your health care provider. Follow directions carefully. Blood pressure medicines must be taken as prescribed.  · Do not skip doses of blood pressure medicine. Doing this puts you at risk for problems and can make the medicine less effective.  · Ask your health care provider about side effects or reactions to medicines that you should watch for.  Contact a health care provider if:  · You think you are having a reaction to a medicine you are taking.  · You have headaches that keep coming back (recurring).  · You feel dizzy.  · You have swelling in your ankles.  · You have trouble with your vision.  Get help right away if:  · You develop a severe headache or confusion.  · You have unusual weakness or numbness.  · You feel faint.  · You have severe pain in your chest or abdomen.  · You vomit repeatedly.  · You have trouble breathing.  Summary  · Hypertension is when the force of blood pumping through your arteries is too strong. If this condition is not controlled, it may put you at risk for serious complications.  · Your personal target blood pressure may vary depending on your medical conditions, your age, and other factors. For most people, a normal blood pressure is less than 120/80.  · Hypertension is treated with lifestyle changes, medicines, or a combination of both. Lifestyle changes include weight loss, eating a healthy, low-sodium diet, exercising more, and limiting alcohol.  This information is not intended to replace advice given to you by your health care provider. Make sure you discuss any questions you have with your health care provider.  Document Released: 12/18/2006 Document Revised: 11/15/2017  Document Reviewed: 11/15/2017  [employer] Interactive Patient Education © 2018 [employer]
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
## 2019-08-21 ENCOUNTER — OFFICE VISIT (OUTPATIENT)
Dept: CARDIOLOGY | Facility: CLINIC | Age: 59
End: 2019-08-21

## 2019-08-21 VITALS
BODY MASS INDEX: 34.02 KG/M2 | HEIGHT: 71 IN | OXYGEN SATURATION: 97 % | DIASTOLIC BLOOD PRESSURE: 86 MMHG | WEIGHT: 243 LBS | SYSTOLIC BLOOD PRESSURE: 141 MMHG | HEART RATE: 71 BPM

## 2019-08-21 DIAGNOSIS — I25.10 CORONARY ARTERY DISEASE INVOLVING NATIVE CORONARY ARTERY OF NATIVE HEART WITHOUT ANGINA PECTORIS: Primary | ICD-10-CM

## 2019-08-21 DIAGNOSIS — E78.5 HYPERLIPIDEMIA, UNSPECIFIED HYPERLIPIDEMIA TYPE: ICD-10-CM

## 2019-08-21 DIAGNOSIS — I10 ESSENTIAL HYPERTENSION: ICD-10-CM

## 2019-08-21 DIAGNOSIS — F17.200 SMOKING: ICD-10-CM

## 2019-08-21 DIAGNOSIS — Z00.00 HEALTHCARE MAINTENANCE: ICD-10-CM

## 2019-08-21 DIAGNOSIS — R06.02 SHORTNESS OF BREATH: ICD-10-CM

## 2019-08-21 PROCEDURE — 99214 OFFICE O/P EST MOD 30 MIN: CPT | Performed by: NURSE PRACTITIONER

## 2019-08-21 RX ORDER — CLOPIDOGREL BISULFATE 75 MG/1
75 TABLET ORAL DAILY
Qty: 90 TABLET | Refills: 3 | Status: SHIPPED | OUTPATIENT
Start: 2019-08-21 | End: 2019-11-07 | Stop reason: SDUPTHER

## 2019-08-21 RX ORDER — ATORVASTATIN CALCIUM 40 MG/1
40 TABLET, FILM COATED ORAL DAILY
Qty: 90 TABLET | Refills: 3 | Status: SHIPPED | OUTPATIENT
Start: 2019-08-21 | End: 2019-11-07 | Stop reason: SDUPTHER

## 2019-08-21 RX ORDER — AMLODIPINE BESYLATE AND BENAZEPRIL HYDROCHLORIDE 5; 20 MG/1; MG/1
1 CAPSULE ORAL DAILY
Qty: 90 CAPSULE | Refills: 3 | Status: SHIPPED | OUTPATIENT
Start: 2019-08-21 | End: 2019-11-07 | Stop reason: SDUPTHER

## 2019-08-21 RX ORDER — ATENOLOL 25 MG/1
25 TABLET ORAL DAILY
Qty: 90 TABLET | Refills: 3 | Status: SHIPPED | OUTPATIENT
Start: 2019-08-21 | End: 2019-11-07 | Stop reason: SDUPTHER
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
## 2019-08-21 NOTE — PATIENT INSTRUCTIONS
Steps to Quit Smoking    Smoking tobacco can be bad for your health. It can also affect almost every organ in your body. Smoking puts you and people around you at risk for many serious long-lasting (chronic) diseases. Quitting smoking is hard, but it is one of the best things that you can do for your health. It is never too late to quit.  What are the benefits of quitting smoking?  When you quit smoking, you lower your risk for getting serious diseases and conditions. They can include:  · Lung cancer or lung disease.  · Heart disease.  · Stroke.  · Heart attack.  · Not being able to have children (infertility).  · Weak bones (osteoporosis) and broken bones (fractures).  If you have coughing, wheezing, and shortness of breath, those symptoms may get better when you quit. You may also get sick less often. If you are pregnant, quitting smoking can help to lower your chances of having a baby of low birth weight.  What can I do to help me quit smoking?  Talk with your doctor about what can help you quit smoking. Some things you can do (strategies) include:  · Quitting smoking totally, instead of slowly cutting back how much you smoke over a period of time.  · Going to in-person counseling. You are more likely to quit if you go to many counseling sessions.  · Using resources and support systems, such as:  ? Online chats with a counselor.  ? Phone quitlines.  ? Printed self-help materials.  ? Support groups or group counseling.  ? Text messaging programs.  ? Mobile phone apps or applications.  · Taking medicines. Some of these medicines may have nicotine in them. If you are pregnant or breastfeeding, do not take any medicines to quit smoking unless your doctor says it is okay. Talk with your doctor about counseling or other things that can help you.  Talk with your doctor about using more than one strategy at the same time, such as taking medicines while you are also going to in-person counseling. This can help make  quitting easier.  What things can I do to make it easier to quit?  Quitting smoking might feel very hard at first, but there is a lot that you can do to make it easier. Take these steps:  · Talk to your family and friends. Ask them to support and encourage you.  · Call phone quitlines, reach out to support groups, or work with a counselor.  · Ask people who smoke to not smoke around you.  · Avoid places that make you want (trigger) to smoke, such as:  ? Bars.  ? Parties.  ? Smoke-break areas at work.  · Spend time with people who do not smoke.  · Lower the stress in your life. Stress can make you want to smoke. Try these things to help your stress:  ? Getting regular exercise.  ? Deep-breathing exercises.  ? Yoga.  ? Meditating.  ? Doing a body scan. To do this, close your eyes, focus on one area of your body at a time from head to toe, and notice which parts of your body are tense. Try to relax the muscles in those areas.  · Download or buy apps on your mobile phone or tablet that can help you stick to your quit plan. There are many free apps, such as QuitGuide from the CDC (Centers for Disease Control and Prevention). You can find more support from smokefree.gov and other websites.  This information is not intended to replace advice given to you by your health care provider. Make sure you discuss any questions you have with your health care provider.  Document Released: 10/14/2010 Document Revised: 08/15/2017 Document Reviewed: 05/03/2016  [employer] Interactive Patient Education © 2019 [employer]  Fat and Cholesterol Restricted Eating Plan  Getting too much fat and cholesterol in your diet may cause health problems. Choosing the right foods helps keep your fat and cholesterol at normal levels. This can keep you from getting certain diseases.  Your doctor may recommend an eating plan that includes:  · Total fat: ______% or less of total calories a day.  · Saturated fat: ______% or less of total calories a  "day.  · Cholesterol: less than _________mg a day.  · Fiber: ______g a day.  What are tips for following this plan?  General tips    · Work with your doctor to lose weight if you need to.  · Avoid:  ? Foods with added sugar.  ? Fried foods.  ? Foods with partially hydrogenated oils.  · Limit alcohol intake to no more than 1 drink a day for nonpregnant women and 2 drinks a day for men. One drink equals 12 oz of beer, 5 oz of wine, or 1½ oz of hard liquor.  Reading food labels  · Check food labels for:  ? Trans fats.  ? Partially hydrogenated oils.  ? Saturated fat (g) in each serving.  ? Cholesterol (mg) in each serving.  ? Fiber (g) in each serving.  · Choose foods with healthy fats, such as:  ? Monounsaturated fats.  ? Polyunsaturated fats.  ? Omega-3 fats.  · Choose grain products that have whole grains. Look for the word \"whole\" as the first word in the ingredient list.  Cooking  · Cook foods using low-fat methods. These include baking, boiling, grilling, and broiling.  · Eat more home-cooked foods. Eat at restaurants and buffets less often.  · Avoid cooking using saturated fats, such as butter, cream, palm oil, palm kernel oil, and coconut oil.  Meal planning    · At meals, divide your plate into four equal parts:  ? Fill one-half of your plate with vegetables and green salads.  ? Fill one-fourth of your plate with whole grains.  ? Fill one-fourth of your plate with low-fat (lean) protein foods.  · Eat fish that is high in omega-3 fats at least two times a week. This includes mackerel, tuna, sardines, and salmon.  · Eat foods that are high in fiber, such as whole grains, beans, apples, broccoli, carrots, peas, and barley.  Recommended foods  Grains  · Whole grains, such as whole wheat or whole grain breads, crackers, cereals, and pasta. Unsweetened oatmeal, bulgur, barley, quinoa, or brown rice. Corn or whole wheat flour tortillas.  Vegetables  · Fresh or frozen vegetables (raw, steamed, roasted, or grilled). " Green salads.  Fruits  · All fresh, canned (in natural juice), or frozen fruits.  Meats and other protein foods  · Ground beef (85% or leaner), grass-fed beef, or beef trimmed of fat. Skinless chicken or turkey. Ground chicken or turkey. Pork trimmed of fat. All fish and seafood. Egg whites. Dried beans, peas, or lentils. Unsalted nuts or seeds. Unsalted canned beans. Nut butters without added sugar or oil.  Dairy  · Low-fat or nonfat dairy products, such as skim or 1% milk, 2% or reduced-fat cheeses, low-fat and fat-free ricotta or cottage cheese, or plain low-fat and nonfat yogurt.  Fats and oils  · Tub margarine without trans fats. Light or reduced-fat mayonnaise and salad dressings. Avocado. Olive, canola, sesame, or safflower oils.  The items listed above may not be a complete list of recommended foods or beverages. Contact your dietitian for more options.  The items listed above may not be a complete list of foods and beverages [you/your child] can eat. Contact a dietitian for more information.  Foods to avoid  Grains  · White bread. White pasta. White rice. Cornbread. Bagels, pastries, and croissants. Crackers and snack foods that contain trans fat and hydrogenated oils.  Vegetables  · Vegetables cooked in cheese, cream, or butter sauce. Fried vegetables.  Fruits  · Canned fruit in heavy syrup. Fruit in cream or butter sauce. Fried fruit.  Meats and other protein foods  · Fatty cuts of meat. Ribs, chicken wings, sanon, sausage, bologna, salami, chitterlings, fatback, hot dogs, bratwurst, and packaged lunch meats. Liver and organ meats. Whole eggs and egg yolks. Chicken and turkey with skin. Fried meat.  Dairy  · Whole or 2% milk, cream, half-and-half, and cream cheese. Whole milk cheeses. Whole-fat or sweetened yogurt. Full-fat cheeses. Nondairy creamers and whipped toppings. Processed cheese, cheese spreads, and cheese curds.  Beverages  · Alcohol. Sugar-sweetened drinks such as sodas, lemonade, and fruit  drinks.  Fats and oils  · Butter, stick margarine, lard, shortening, ghee, or sanon fat. Coconut, palm kernel, and palm oils.  Sweets and desserts  · Corn syrup, sugars, honey, and molasses. Candy. Jam and jelly. Syrup. Sweetened cereals. Cookies, pies, cakes, donuts, muffins, and ice cream.  The items listed above may not be a complete list of foods and beverages to avoid. Contact your dietitian for more information.  The items listed above may not be a complete list of foods and beverages [you/your child] should avoid. Contact a dietitian for more information.  Summary  · Choosing the right foods helps keep your fat and cholesterol at normal levels. This can keep you from getting certain diseases.  · At meals, fill one-half of your plate with vegetables and green salads.  · Eat high-fiber foods, like whole grains, beans, apples, carrots, peas, and barley.  · Limit added sugar, saturated fats, alcohol, and fried foods.  This information is not intended to replace advice given to you by your health care provider. Make sure you discuss any questions you have with your health care provider.  Document Released: 06/18/2013 Document Revised: 09/04/2018 Document Reviewed: 09/04/2018  [employer] Interactive Patient Education © 2019 [employer]  BMI for Adults    Body mass index (BMI) is a number that is calculated from a person's weight and height. BMI may help to estimate how much of a person's weight is composed of fat. BMI can help identify those who may be at higher risk for certain medical problems.  How is BMI used with adults?  BMI is used as a screening tool to identify possible weight problems. It is used to check whether a person is obese, overweight, healthy weight, or underweight.  How is BMI calculated?  BMI measures your weight and compares it to your height. This can be done either in English ([place] or metric measurements. Note that charts are available to help you find your BMI quickly and easily without  "having to do these calculations yourself.  To calculate your BMI in English ([place] measurements, your health care provider will:  1. Measure your weight in pounds (lb).  2. Multiply the number of pounds by 703.  ? For example, for a person who weighs 180 lb, multiply that number by 703, which equals 126,540.  3. Measure your height in inches (in). Then multiply that number by itself to get a measurement called \"inches squared.\"  ? For example, for a person who is 70 in tall, the \"inches squared\" measurement is 70 in x 70 in, which equals 4900 inches squared.  4. Divide the total from Step 2 (number of lb x 703) by the total from Step 3 (inches squared): 126,540 ÷ 4900 = 25.8. This is your BMI.  To calculate your BMI in metric measurements, your health care provider will:  1. Measure your weight in kilograms (kg).  2. Measure your height in meters (m). Then multiply that number by itself to get a measurement called \"meters squared.\"  ? For example, for a person who is 1.75 m tall, the \"meters squared\" measurement is 1.75 m x 1.75 m, which is equal to 3.1 meters squared.  3. Divide the number of kilograms (your weight) by the meters squared number. In this example: 70 ÷ 3.1 = 22.6. This is your BMI.  How is BMI interpreted?  To interpret your results, your health care provider will use BMI charts to identify whether you are underweight, normal weight, overweight, or obese. The following guidelines will be used:  · Underweight: BMI less than 18.5.  · Normal weight: BMI between 18.5 and 24.9.  · Overweight: BMI between 25 and 29.9.  · Obese: BMI of 30 and above.  Please note:  · Weight includes both fat and muscle, so someone with a muscular build, such as an athlete, may have a BMI that is higher than 24.9. In cases like these, BMI is not an accurate measure of body fat.  · To determine if excess body fat is the cause of a BMI of 25 or higher, further assessments may need to be done by a health care provider.  · BMI is " "usually interpreted in the same way for men and women.  Why is BMI a useful tool?  BMI is useful in two ways:  · Identifying a weight problem that may be related to a medical condition, or that may increase the risk for medical problems.  · Promoting lifestyle and diet changes in order to reach a healthy weight.  Summary  · Body mass index (BMI) is a number that is calculated from a person's weight and height.  · BMI may help to estimate how much of a person's weight is composed of fat. BMI can help identify those who may be at higher risk for certain medical problems.  · BMI can be measured using English measurements or metric measurements.  · To interpret your results, your health care provider will use BMI charts to identify whether you are underweight, normal weight, overweight, or obese.  This information is not intended to replace advice given to you by your health care provider. Make sure you discuss any questions you have with your health care provider.  Document Released: 08/29/2005 Document Revised: 10/31/2018 Document Reviewed: 10/31/2018  [employer] Interactive Patient Education © 2019 [employer]    Hypertension  Hypertension, commonly called high blood pressure, is when the force of blood pumping through the arteries is too strong. The arteries are the blood vessels that carry blood from the heart throughout the body. Hypertension forces the heart to work harder to pump blood and may cause arteries to become narrow or stiff. Having untreated or uncontrolled hypertension can cause heart attacks, strokes, kidney disease, and other problems.  A blood pressure reading consists of a higher number over a lower number. Ideally, your blood pressure should be below 120/80. The first (\"top\") number is called the systolic pressure. It is a measure of the pressure in your arteries as your heart beats. The second (\"bottom\") number is called the diastolic pressure. It is a measure of the pressure in your arteries as " the heart relaxes.  What are the causes?  The cause of this condition is not known.  What increases the risk?  Some risk factors for high blood pressure are under your control. Others are not.  Factors you can change  · Smoking.  · Having type 2 diabetes mellitus, high cholesterol, or both.  · Not getting enough exercise or physical activity.  · Being overweight.  · Having too much fat, sugar, calories, or salt (sodium) in your diet.  · Drinking too much alcohol.  Factors that are difficult or impossible to change  · Having chronic kidney disease.  · Having a family history of high blood pressure.  · Age. Risk increases with age.  · Race. You may be at higher risk if you are -American.  · Gender. Men are at higher risk than women before age 45. After age 65, women are at higher risk than men.  · Having obstructive sleep apnea.  · Stress.  What are the signs or symptoms?  Extremely high blood pressure (hypertensive crisis) may cause:  · Headache.  · Anxiety.  · Shortness of breath.  · Nosebleed.  · Nausea and vomiting.  · Severe chest pain.  · Jerky movements you cannot control (seizures).  How is this diagnosed?  This condition is diagnosed by measuring your blood pressure while you are seated, with your arm resting on a surface. The cuff of the blood pressure monitor will be placed directly against the skin of your upper arm at the level of your heart. It should be measured at least twice using the same arm. Certain conditions can cause a difference in blood pressure between your right and left arms.  Certain factors can cause blood pressure readings to be lower or higher than normal (elevated) for a short period of time:  · When your blood pressure is higher when you are in a health care provider's office than when you are at home, this is called white coat hypertension. Most people with this condition do not need medicines.  · When your blood pressure is higher at home than when you are in a health care  provider's office, this is called masked hypertension. Most people with this condition may need medicines to control blood pressure.  If you have a high blood pressure reading during one visit or you have normal blood pressure with other risk factors:  · You may be asked to return on a different day to have your blood pressure checked again.  · You may be asked to monitor your blood pressure at home for 1 week or longer.  If you are diagnosed with hypertension, you may have other blood or imaging tests to help your health care provider understand your overall risk for other conditions.  How is this treated?  This condition is treated by making healthy lifestyle changes, such as eating healthy foods, exercising more, and reducing your alcohol intake. Your health care provider may prescribe medicine if lifestyle changes are not enough to get your blood pressure under control, and if:  · Your systolic blood pressure is above 130.  · Your diastolic blood pressure is above 80.  Your personal target blood pressure may vary depending on your medical conditions, your age, and other factors.  Follow these instructions at home:  Eating and drinking    · Eat a diet that is high in fiber and potassium, and low in sodium, added sugar, and fat. An example eating plan is called the DASH (Dietary Approaches to Stop Hypertension) diet. To eat this way:  ? Eat plenty of fresh fruits and vegetables. Try to fill half of your plate at each meal with fruits and vegetables.  ? Eat whole grains, such as whole wheat pasta, brown rice, or whole grain bread. Fill about one quarter of your plate with whole grains.  ? Eat or drink low-fat dairy products, such as skim milk or low-fat yogurt.  ? Avoid fatty cuts of meat, processed or cured meats, and poultry with skin. Fill about one quarter of your plate with lean proteins, such as fish, chicken without skin, beans, eggs, and tofu.  ? Avoid premade and processed foods. These tend to be higher in  sodium, added sugar, and fat.  · Reduce your daily sodium intake. Most people with hypertension should eat less than 1,500 mg of sodium a day.  · Limit alcohol intake to no more than 1 drink a day for nonpregnant women and 2 drinks a day for men. One drink equals 12 oz of beer, 5 oz of wine, or 1½ oz of hard liquor.  Lifestyle    · Work with your health care provider to maintain a healthy body weight or to lose weight. Ask what an ideal weight is for you.  · Get at least 30 minutes of exercise that causes your heart to beat faster (aerobic exercise) most days of the week. Activities may include walking, swimming, or biking.  · Include exercise to strengthen your muscles (resistance exercise), such as pilates or lifting weights, as part of your weekly exercise routine. Try to do these types of exercises for 30 minutes at least 3 days a week.  · Do not use any products that contain nicotine or tobacco, such as cigarettes and e-cigarettes. If you need help quitting, ask your health care provider.  · Monitor your blood pressure at home as told by your health care provider.  · Keep all follow-up visits as told by your health care provider. This is important.  Medicines  · Take over-the-counter and prescription medicines only as told by your health care provider. Follow directions carefully. Blood pressure medicines must be taken as prescribed.  · Do not skip doses of blood pressure medicine. Doing this puts you at risk for problems and can make the medicine less effective.  · Ask your health care provider about side effects or reactions to medicines that you should watch for.  Contact a health care provider if:  · You think you are having a reaction to a medicine you are taking.  · You have headaches that keep coming back (recurring).  · You feel dizzy.  · You have swelling in your ankles.  · You have trouble with your vision.  Get help right away if:  · You develop a severe headache or confusion.  · You have unusual  weakness or numbness.  · You feel faint.  · You have severe pain in your chest or abdomen.  · You vomit repeatedly.  · You have trouble breathing.  Summary  · Hypertension is when the force of blood pumping through your arteries is too strong. If this condition is not controlled, it may put you at risk for serious complications.  · Your personal target blood pressure may vary depending on your medical conditions, your age, and other factors. For most people, a normal blood pressure is less than 120/80.  · Hypertension is treated with lifestyle changes, medicines, or a combination of both. Lifestyle changes include weight loss, eating a healthy, low-sodium diet, exercising more, and limiting alcohol.  This information is not intended to replace advice given to you by your health care provider. Make sure you discuss any questions you have with your health care provider.  Document Released: 12/18/2006 Document Revised: 11/15/2017 Document Reviewed: 11/15/2017  [employer] Interactive Patient Education © 2019 [employer]

## 2019-11-07 ENCOUNTER — TELEPHONE (OUTPATIENT)
Dept: CARDIOLOGY | Facility: CLINIC | Age: 59
End: 2019-11-07

## 2019-11-07 DIAGNOSIS — E78.5 HYPERLIPIDEMIA, UNSPECIFIED HYPERLIPIDEMIA TYPE: ICD-10-CM

## 2019-11-07 DIAGNOSIS — I25.10 CORONARY ARTERY DISEASE INVOLVING NATIVE CORONARY ARTERY OF NATIVE HEART WITHOUT ANGINA PECTORIS: ICD-10-CM

## 2019-11-07 DIAGNOSIS — I10 ESSENTIAL HYPERTENSION: ICD-10-CM

## 2019-11-07 RX ORDER — ATENOLOL 25 MG/1
25 TABLET ORAL DAILY
Qty: 90 TABLET | Refills: 3 | Status: SHIPPED | OUTPATIENT
Start: 2019-11-07 | End: 2020-05-05 | Stop reason: SDUPTHER

## 2019-11-07 RX ORDER — AMLODIPINE BESYLATE AND BENAZEPRIL HYDROCHLORIDE 5; 20 MG/1; MG/1
1 CAPSULE ORAL DAILY
Qty: 90 CAPSULE | Refills: 3 | Status: SHIPPED | OUTPATIENT
Start: 2019-11-07 | End: 2020-05-05 | Stop reason: SDUPTHER

## 2019-11-07 RX ORDER — ATORVASTATIN CALCIUM 40 MG/1
40 TABLET, FILM COATED ORAL DAILY
Qty: 90 TABLET | Refills: 3 | Status: SHIPPED | OUTPATIENT
Start: 2019-11-07 | End: 2020-05-05 | Stop reason: SDUPTHER

## 2019-11-07 RX ORDER — NITROGLYCERIN 0.4 MG/1
TABLET SUBLINGUAL
Qty: 25 TABLET | Refills: 2 | Status: SHIPPED | OUTPATIENT
Start: 2019-11-07 | End: 2023-01-17 | Stop reason: SDUPTHER

## 2019-11-07 RX ORDER — CLOPIDOGREL BISULFATE 75 MG/1
75 TABLET ORAL DAILY
Qty: 90 TABLET | Refills: 3 | Status: SHIPPED | OUTPATIENT
Start: 2019-11-07 | End: 2020-05-05 | Stop reason: SDUPTHER

## 2019-11-07 RX ORDER — ASPIRIN 81 MG/1
81 TABLET ORAL DAILY
Qty: 30 TABLET | Refills: 11 | Status: SHIPPED | OUTPATIENT
Start: 2019-11-07 | End: 2020-05-05 | Stop reason: SDUPTHER

## 2019-11-07 NOTE — TELEPHONE ENCOUNTER
PT CALLED REQUESTING PRESCRIPTIONS FOR CARDIAC MEDS TO BE SENT TO [facility] IN [place], [place], DUE TO PREVIOUS PHARMACY CLOSING.

## 2019-11-20 ENCOUNTER — OFFICE VISIT (OUTPATIENT)
Dept: CARDIOLOGY | Facility: CLINIC | Age: 59
End: 2019-11-20

## 2019-11-20 VITALS
HEART RATE: 64 BPM | DIASTOLIC BLOOD PRESSURE: 85 MMHG | SYSTOLIC BLOOD PRESSURE: 135 MMHG | HEIGHT: 71 IN | OXYGEN SATURATION: 96 % | WEIGHT: 244 LBS | BODY MASS INDEX: 34.16 KG/M2

## 2019-11-20 DIAGNOSIS — I25.10 CORONARY ARTERY DISEASE INVOLVING NATIVE CORONARY ARTERY OF NATIVE HEART WITHOUT ANGINA PECTORIS: Primary | ICD-10-CM

## 2019-11-20 DIAGNOSIS — F17.200 SMOKING: ICD-10-CM

## 2019-11-20 DIAGNOSIS — E78.5 HYPERLIPIDEMIA, UNSPECIFIED HYPERLIPIDEMIA TYPE: ICD-10-CM

## 2019-11-20 DIAGNOSIS — I10 ESSENTIAL HYPERTENSION: ICD-10-CM

## 2019-11-20 PROCEDURE — 99214 OFFICE O/P EST MOD 30 MIN: CPT | Performed by: NURSE PRACTITIONER

## 2019-11-20 NOTE — PROGRESS NOTES
Subjective   Herber Stein is a 59 y.o. male     Chief Complaint   Patient presents with   • Follow-up     Here for a 3 month follow up        HPI    Problem List:    1.) CAD status post stenting to RCA in 2012 with bare metal stent   a. Stress in 2014 showing posterolateral infarct with minimal martine-infarct ischemia.    b. Preserved Systolic fxn  2.) HTN  3.) Diabetes mellitus  4.) Shortness of breath  5.) Normal LV systolic function with EF 60%  5.1) Echo 11/10/14 - mild LVH; EF 50-55%; DD I; basal inferolateral wall segment is hypokinetic, trace AR, TR, MR and ME  6) Smoking Habituation, Cigars    Patient is a 59-year-old male who presents today for a follow-up.  He denies any chest pain, pressure, palpitations, fluttering, dizziness, presyncope, syncope, orthopnea, PND or edema.  He says that he only gets short of breath and fatigued if he over does it.  He says he will stop 5-10 min and then he is good.  I again discussed prior symptoms with stent and also no work-up since 2014, however, patient feels like he is doing really well.  He walks about 1/4 mile/day.  He is still smoking 2 cigars/day.      Current Outpatient Medications on File Prior to Visit   Medication Sig Dispense Refill   • amLODIPine-benazepril (LOTREL 5-20) 5-20 MG per capsule Take 1 capsule by mouth Daily. 90 capsule 3   • aspirin (ASPIRIN LOW DOSE) 81 MG EC tablet Take 1 tablet by mouth Daily. 30 tablet 11   • atenolol (TENORMIN) 25 MG tablet Take 1 tablet by mouth Daily. 90 tablet 3   • atorvastatin (LIPITOR) 40 MG tablet Take 1 tablet by mouth Daily. 90 tablet 3   • cetirizine (ZyrTEC) 10 MG tablet Take 10 mg by mouth Daily.     • clopidogrel (PLAVIX) 75 MG tablet Take 1 tablet by mouth Daily. 90 tablet 3   • Dapagliflozin Propanediol (FARXIGA) 10 MG tablet Take 10 mg by mouth Daily.     • metFORMIN (GLUCOPHAGE) 500 MG tablet Take 500 mg by mouth 2 (Two) Times a Day. 2 tablets twice a day     • nitroglycerin (NITROSTAT) 0.4 MG SL tablet  1 tab under the tongue q 5 min, up to 3x, for CP. Go to ER if it doesn't work. 25 tablet 2   • Omega-3 Fatty Acids (FISH OIL) 1000 MG capsule capsule Take 1,000 mg by mouth Daily.       No current facility-administered medications on file prior to visit.        ALLERGIES    Patient has no known allergies.    Past Medical History:   Diagnosis Date   • Arthritis    • Atherosclerotic heart disease of native coronary artery without angina pectoris    • Chest pain 8/31/2016   • Diabetes mellitus (CMS/HCC) 8/31/2016   • Heart disease 8/31/2016   • HTN (hypertension) 8/31/2016   • Hyperlipidemia    • Migraine headache    • Obesity 8/31/2016   • Sleep apnea    • Umbilical hernia        Social History     Socioeconomic History   • Marital status: Single     Spouse name: Not on file   • Number of children: Not on file   • Years of education: Not on file   • Highest education level: Not on file   Tobacco Use   • Smoking status: Current Some Day Smoker     Types: Cigars   • Smokeless tobacco: Never Used   Substance and Sexual Activity   • Alcohol use: Yes     Comment: 6 pack per month   • Drug use: No   • Sexual activity: Defer       Family History   Problem Relation Age of Onset   • Stroke Mother    • Diabetes Mother    • Heart attack Mother    • Other Mother         CABG, Arotic Valve Replacement   • Hypertension Father    • Heart attack Brother        Review of Systems   Constitutional: Positive for fatigue (if over does it rests 5-10 min goes on ). Negative for chills and fever.   HENT: Positive for congestion (stuffy nose ). Negative for rhinorrhea and sore throat.    Eyes: Positive for visual disturbance (glasses daily ).   Respiratory: Positive for shortness of breath (if he over does it he will sit down for 5-10 min and is fine.  ). Negative for chest tightness.    Cardiovascular: Negative for chest pain, palpitations and leg swelling.   Gastrointestinal: Negative for abdominal pain, blood in stool, nausea and vomiting.  "  Endocrine: Negative for cold intolerance and heat intolerance.   Genitourinary: Negative for dysuria, frequency, hematuria and urgency.   Musculoskeletal: Positive for arthralgias (joints, right shoulder, using a sling ) and back pain (low back pain ). Negative for neck pain.        Right arm sling    Skin: Negative for rash and wound.   Allergic/Immunologic: Negative for environmental allergies and food allergies.   Neurological: Negative for dizziness, weakness and light-headedness.   Hematological: Bruises/bleeds easily (bruises easily ).   Psychiatric/Behavioral: Negative for sleep disturbance (denies waking with smothering ).       Objective   /85 (BP Location: Left arm, Patient Position: Sitting)   Pulse 64   Ht 180.3 cm (71\")   Wt 111 kg (244 lb)   SpO2 96%   BMI 34.03 kg/m²   Vitals:    11/20/19 0833   BP: 135/85   BP Location: Left arm   Patient Position: Sitting   Pulse: 64   SpO2: 96%   Weight: 111 kg (244 lb)   Height: 180.3 cm (71\")      Lab Results (most recent)     None        Physical Exam   Constitutional: He is oriented to person, place, and time. Vital signs are normal. He appears well-developed and well-nourished. He is active and cooperative.   HENT:   Head: Normocephalic.   Mouth/Throat: Abnormal dentition (poor).   Eyes: Lids are normal.   Wears glasses    Neck: Normal carotid pulses, no hepatojugular reflux and no JVD present. Carotid bruit is not present.   Cardiovascular: Normal rate, regular rhythm and normal heart sounds.   Pulses:       Radial pulses are 2+ on the right side, and 2+ on the left side.        Dorsalis pedis pulses are 2+ on the right side, and 2+ on the left side.        Posterior tibial pulses are 2+ on the right side, and 2+ on the left side.   No edema BLE.   Pulmonary/Chest: Effort normal and breath sounds normal.   Abdominal: Normal appearance and bowel sounds are normal.   Musculoskeletal:   Right arm sling    Neurological: He is alert and oriented to " person, place, and time.   Skin: Skin is warm, dry and intact.   Psychiatric: He has a normal mood and affect. His speech is normal and behavior is normal. Judgment and thought content normal. Cognition and memory are normal.       Procedure   Procedures         Assessment/Plan      Diagnosis Plan   1. Coronary artery disease involving native coronary artery of native heart without angina pectoris     2. Essential hypertension     3. Hyperlipidemia, unspecified hyperlipidemia type     4. Smoking         Return in about 6 months (around 5/20/2020).    CAD - ASA, beta and statin.  Hypertension - patient is on lotrel and beta.  Hyperlipidemia - patient is on statin.  He will continue his medication regimen.  He will follow-up in 6 mos or sooner if any changes.        Herber Stein is a current cigars user.  He currently smokes 2 cigars per day for a duration of 10 years. I have educated him on the risk of diseases from using tobacco products such as cancer, COPD and heart diease.     I advised him to quit and he is not willing to quit.      Patient's Body mass index is 34.03 kg/m². BMI is above normal parameters. Recommendations include: educational material and referral to primary care.      Electronically signed by:

## 2019-11-20 NOTE — PATIENT INSTRUCTIONS
Steps to Quit Smoking    Smoking tobacco can be bad for your health. It can also affect almost every organ in your body. Smoking puts you and people around you at risk for many serious long-lasting (chronic) diseases. Quitting smoking is hard, but it is one of the best things that you can do for your health. It is never too late to quit.  What are the benefits of quitting smoking?  When you quit smoking, you lower your risk for getting serious diseases and conditions. They can include:  · Lung cancer or lung disease.  · Heart disease.  · Stroke.  · Heart attack.  · Not being able to have children (infertility).  · Weak bones (osteoporosis) and broken bones (fractures).  If you have coughing, wheezing, and shortness of breath, those symptoms may get better when you quit. You may also get sick less often. If you are pregnant, quitting smoking can help to lower your chances of having a baby of low birth weight.  What can I do to help me quit smoking?  Talk with your doctor about what can help you quit smoking. Some things you can do (strategies) include:  · Quitting smoking totally, instead of slowly cutting back how much you smoke over a period of time.  · Going to in-person counseling. You are more likely to quit if you go to many counseling sessions.  · Using resources and support systems, such as:  ? Online chats with a counselor.  ? Phone quitlines.  ? Printed self-help materials.  ? Support groups or group counseling.  ? Text messaging programs.  ? Mobile phone apps or applications.  · Taking medicines. Some of these medicines may have nicotine in them. If you are pregnant or breastfeeding, do not take any medicines to quit smoking unless your doctor says it is okay. Talk with your doctor about counseling or other things that can help you.  Talk with your doctor about using more than one strategy at the same time, such as taking medicines while you are also going to in-person counseling. This can help make  quitting easier.  What things can I do to make it easier to quit?  Quitting smoking might feel very hard at first, but there is a lot that you can do to make it easier. Take these steps:  · Talk to your family and friends. Ask them to support and encourage you.  · Call phone quitlines, reach out to support groups, or work with a counselor.  · Ask people who smoke to not smoke around you.  · Avoid places that make you want (trigger) to smoke, such as:  ? Bars.  ? Parties.  ? Smoke-break areas at work.  · Spend time with people who do not smoke.  · Lower the stress in your life. Stress can make you want to smoke. Try these things to help your stress:  ? Getting regular exercise.  ? Deep-breathing exercises.  ? Yoga.  ? Meditating.  ? Doing a body scan. To do this, close your eyes, focus on one area of your body at a time from head to toe, and notice which parts of your body are tense. Try to relax the muscles in those areas.  · Download or buy apps on your mobile phone or tablet that can help you stick to your quit plan. There are many free apps, such as QuitGuide from the CDC (Centers for Disease Control and Prevention). You can find more support from smokefree.gov and other websites.  This information is not intended to replace advice given to you by your health care provider. Make sure you discuss any questions you have with your health care provider.  Document Released: 10/14/2010 Document Revised: 08/15/2017 Document Reviewed: 05/03/2016  [employer] Interactive Patient Education © 2019 [employer]  Fat and Cholesterol Restricted Eating Plan  Getting too much fat and cholesterol in your diet may cause health problems. Choosing the right foods helps keep your fat and cholesterol at normal levels. This can keep you from getting certain diseases.  Your doctor may recommend an eating plan that includes:  · Total fat: ______% or less of total calories a day.  · Saturated fat: ______% or less of total calories a  "day.  · Cholesterol: less than _________mg a day.  · Fiber: ______g a day.  What are tips for following this plan?  Meal planning  · At meals, divide your plate into four equal parts:  ? Fill one-half of your plate with vegetables and green salads.  ? Fill one-fourth of your plate with whole grains.  ? Fill one-fourth of your plate with low-fat (lean) protein foods.  · Eat fish that is high in omega-3 fats at least two times a week. This includes mackerel, tuna, sardines, and salmon.  · Eat foods that are high in fiber, such as whole grains, beans, apples, broccoli, carrots, peas, and barley.  General tips    · Work with your doctor to lose weight if you need to.  · Avoid:  ? Foods with added sugar.  ? Fried foods.  ? Foods with partially hydrogenated oils.  · Limit alcohol intake to no more than 1 drink a day for nonpregnant women and 2 drinks a day for men. One drink equals 12 oz of beer, 5 oz of wine, or 1½ oz of hard liquor.  Reading food labels  · Check food labels for:  ? Trans fats.  ? Partially hydrogenated oils.  ? Saturated fat (g) in each serving.  ? Cholesterol (mg) in each serving.  ? Fiber (g) in each serving.  · Choose foods with healthy fats, such as:  ? Monounsaturated fats.  ? Polyunsaturated fats.  ? Omega-3 fats.  · Choose grain products that have whole grains. Look for the word \"whole\" as the first word in the ingredient list.  Cooking  · Cook foods using low-fat methods. These include baking, boiling, grilling, and broiling.  · Eat more home-cooked foods. Eat at restaurants and buffets less often.  · Avoid cooking using saturated fats, such as butter, cream, palm oil, palm kernel oil, and coconut oil.  Recommended foods    Fruits  · All fresh, canned (in natural juice), or frozen fruits.  Vegetables  · Fresh or frozen vegetables (raw, steamed, roasted, or grilled). Green salads.  Grains  · Whole grains, such as whole wheat or whole grain breads, crackers, cereals, and pasta. Unsweetened " oatmeal, bulgur, barley, quinoa, or brown rice. Corn or whole wheat flour tortillas.  Meats and other protein foods  · Ground beef (85% or leaner), grass-fed beef, or beef trimmed of fat. Skinless chicken or turkey. Ground chicken or turkey. Pork trimmed of fat. All fish and seafood. Egg whites. Dried beans, peas, or lentils. Unsalted nuts or seeds. Unsalted canned beans. Nut butters without added sugar or oil.  Dairy  · Low-fat or nonfat dairy products, such as skim or 1% milk, 2% or reduced-fat cheeses, low-fat and fat-free ricotta or cottage cheese, or plain low-fat and nonfat yogurt.  Fats and oils  · Tub margarine without trans fats. Light or reduced-fat mayonnaise and salad dressings. Avocado. Olive, canola, sesame, or safflower oils.  The items listed above may not be a complete list of foods and beverages you can eat. Contact a dietitian for more information.  Foods to avoid  Fruits  · Canned fruit in heavy syrup. Fruit in cream or butter sauce. Fried fruit.  Vegetables  · Vegetables cooked in cheese, cream, or butter sauce. Fried vegetables.  Grains  · White bread. White pasta. White rice. Cornbread. Bagels, pastries, and croissants. Crackers and snack foods that contain trans fat and hydrogenated oils.  Meats and other protein foods  · Fatty cuts of meat. Ribs, chicken wings, sanon, sausage, bologna, salami, chitterlings, fatback, hot dogs, bratwurst, and packaged lunch meats. Liver and organ meats. Whole eggs and egg yolks. Chicken and turkey with skin. Fried meat.  Dairy  · Whole or 2% milk, cream, half-and-half, and cream cheese. Whole milk cheeses. Whole-fat or sweetened yogurt. Full-fat cheeses. Nondairy creamers and whipped toppings. Processed cheese, cheese spreads, and cheese curds.  Beverages  · Alcohol. Sugar-sweetened drinks such as sodas, lemonade, and fruit drinks.  Fats and oils  · Butter, stick margarine, lard, shortening, ghee, or sanon fat. Coconut, palm kernel, and palm oils.  Sweets and  desserts  · Corn syrup, sugars, honey, and molasses. Candy. Jam and jelly. Syrup. Sweetened cereals. Cookies, pies, cakes, donuts, muffins, and ice cream.  The items listed above may not be a complete list of foods and beverages you should avoid. Contact a dietitian for more information.  Summary  · Choosing the right foods helps keep your fat and cholesterol at normal levels. This can keep you from getting certain diseases.  · At meals, fill one-half of your plate with vegetables and green salads.  · Eat high-fiber foods, like whole grains, beans, apples, carrots, peas, and barley.  · Limit added sugar, saturated fats, alcohol, and fried foods.  This information is not intended to replace advice given to you by your health care provider. Make sure you discuss any questions you have with your health care provider.  Document Released: 06/18/2013 Document Revised: 08/21/2019 Document Reviewed: 09/04/2018  [employer] Interactive Patient Education © 2019 [employer]  BMI for Adults    Body mass index (BMI) is a number that is calculated from a person's weight and height. BMI may help to estimate how much of a person's weight is composed of fat. BMI can help identify those who may be at higher risk for certain medical problems.  How is BMI used with adults?  BMI is used as a screening tool to identify possible weight problems. It is used to check whether a person is obese, overweight, healthy weight, or underweight.  How is BMI calculated?  BMI measures your weight and compares it to your height. This can be done either in English ([place] or metric measurements. Note that charts are available to help you find your BMI quickly and easily without having to do these calculations yourself.  To calculate your BMI in English ([place] measurements, your health care provider will:  1. Measure your weight in pounds (lb).  2. Multiply the number of pounds by 703.  ? For example, for a person who weighs 180 lb, multiply that number by  "703, which equals 126,540.  3. Measure your height in inches (in). Then multiply that number by itself to get a measurement called \"inches squared.\"  ? For example, for a person who is 70 in tall, the \"inches squared\" measurement is 70 in x 70 in, which equals 4900 inches squared.  4. Divide the total from Step 2 (number of lb x 703) by the total from Step 3 (inches squared): 126,540 ÷ 4900 = 25.8. This is your BMI.  To calculate your BMI in metric measurements, your health care provider will:  1. Measure your weight in kilograms (kg).  2. Measure your height in meters (m). Then multiply that number by itself to get a measurement called \"meters squared.\"  ? For example, for a person who is 1.75 m tall, the \"meters squared\" measurement is 1.75 m x 1.75 m, which is equal to 3.1 meters squared.  3. Divide the number of kilograms (your weight) by the meters squared number. In this example: 70 ÷ 3.1 = 22.6. This is your BMI.  How is BMI interpreted?  To interpret your results, your health care provider will use BMI charts to identify whether you are underweight, normal weight, overweight, or obese. The following guidelines will be used:  · Underweight: BMI less than 18.5.  · Normal weight: BMI between 18.5 and 24.9.  · Overweight: BMI between 25 and 29.9.  · Obese: BMI of 30 and above.  Please note:  · Weight includes both fat and muscle, so someone with a muscular build, such as an athlete, may have a BMI that is higher than 24.9. In cases like these, BMI is not an accurate measure of body fat.  · To determine if excess body fat is the cause of a BMI of 25 or higher, further assessments may need to be done by a health care provider.  · BMI is usually interpreted in the same way for men and women.  Why is BMI a useful tool?  BMI is useful in two ways:  · Identifying a weight problem that may be related to a medical condition, or that may increase the risk for medical problems.  · Promoting lifestyle and diet changes in " order to reach a healthy weight.  Summary  · Body mass index (BMI) is a number that is calculated from a person's weight and height.  · BMI may help to estimate how much of a person's weight is composed of fat. BMI can help identify those who may be at higher risk for certain medical problems.  · BMI can be measured using English measurements or metric measurements.  · To interpret your results, your health care provider will use BMI charts to identify whether you are underweight, normal weight, overweight, or obese.  This information is not intended to replace advice given to you by your health care provider. Make sure you discuss any questions you have with your health care provider.  Document Released: 08/29/2005 Document Revised: 10/31/2018 Document Reviewed: 10/31/2018  [employer] Interactive Patient Education © 2019 [employer]

## 2020-05-05 ENCOUNTER — OFFICE VISIT (OUTPATIENT)
Dept: CARDIOLOGY | Facility: CLINIC | Age: 60
End: 2020-05-05

## 2020-05-05 VITALS
DIASTOLIC BLOOD PRESSURE: 69 MMHG | WEIGHT: 245 LBS | HEIGHT: 71 IN | HEART RATE: 74 BPM | BODY MASS INDEX: 34.3 KG/M2 | SYSTOLIC BLOOD PRESSURE: 113 MMHG

## 2020-05-05 DIAGNOSIS — I10 ESSENTIAL HYPERTENSION: ICD-10-CM

## 2020-05-05 DIAGNOSIS — I25.10 CORONARY ARTERY DISEASE INVOLVING NATIVE CORONARY ARTERY OF NATIVE HEART WITHOUT ANGINA PECTORIS: Primary | ICD-10-CM

## 2020-05-05 DIAGNOSIS — F17.200 SMOKING: ICD-10-CM

## 2020-05-05 DIAGNOSIS — E78.5 HYPERLIPIDEMIA, UNSPECIFIED HYPERLIPIDEMIA TYPE: ICD-10-CM

## 2020-05-05 PROCEDURE — 99441 PR PHYS/QHP TELEPHONE EVALUATION 5-10 MIN: CPT | Performed by: NURSE PRACTITIONER

## 2020-05-05 RX ORDER — ATORVASTATIN CALCIUM 40 MG/1
40 TABLET, FILM COATED ORAL DAILY
Qty: 90 TABLET | Refills: 3 | Status: SHIPPED | OUTPATIENT
Start: 2020-05-05 | End: 2021-03-03 | Stop reason: SDUPTHER

## 2020-05-05 RX ORDER — CLOPIDOGREL BISULFATE 75 MG/1
75 TABLET ORAL DAILY
Qty: 90 TABLET | Refills: 3 | Status: SHIPPED | OUTPATIENT
Start: 2020-05-05 | End: 2021-03-03 | Stop reason: SDUPTHER

## 2020-05-05 RX ORDER — ASPIRIN 81 MG/1
81 TABLET ORAL DAILY
Qty: 90 TABLET | Refills: 3 | Status: SHIPPED | OUTPATIENT
Start: 2020-05-05 | End: 2021-03-03 | Stop reason: SDUPTHER

## 2020-05-05 RX ORDER — ATENOLOL 25 MG/1
25 TABLET ORAL DAILY
Qty: 90 TABLET | Refills: 3 | Status: SHIPPED | OUTPATIENT
Start: 2020-05-05 | End: 2021-03-03 | Stop reason: SDUPTHER

## 2020-05-05 RX ORDER — AMLODIPINE BESYLATE AND BENAZEPRIL HYDROCHLORIDE 5; 20 MG/1; MG/1
1 CAPSULE ORAL DAILY
Qty: 90 CAPSULE | Refills: 3 | Status: SHIPPED | OUTPATIENT
Start: 2020-05-05 | End: 2021-03-03 | Stop reason: DRUGHIGH

## 2020-05-05 RX ORDER — CHLORAL HYDRATE 500 MG
1000 CAPSULE ORAL DAILY
Qty: 90 CAPSULE | Refills: 3 | Status: SHIPPED | OUTPATIENT
Start: 2020-05-05 | End: 2022-01-07 | Stop reason: SDUPTHER

## 2020-05-05 NOTE — PATIENT INSTRUCTIONS
Steps to Quit Smoking    Smoking tobacco can be bad for your health. It can also affect almost every organ in your body. Smoking puts you and people around you at risk for many serious long-lasting (chronic) diseases. Quitting smoking is hard, but it is one of the best things that you can do for your health. It is never too late to quit.  What are the benefits of quitting smoking?  When you quit smoking, you lower your risk for getting serious diseases and conditions. They can include:  · Lung cancer or lung disease.  · Heart disease.  · Stroke.  · Heart attack.  · Not being able to have children (infertility).  · Weak bones (osteoporosis) and broken bones (fractures).  If you have coughing, wheezing, and shortness of breath, those symptoms may get better when you quit. You may also get sick less often. If you are pregnant, quitting smoking can help to lower your chances of having a baby of low birth weight.  What can I do to help me quit smoking?  Talk with your doctor about what can help you quit smoking. Some things you can do (strategies) include:  · Quitting smoking totally, instead of slowly cutting back how much you smoke over a period of time.  · Going to in-person counseling. You are more likely to quit if you go to many counseling sessions.  · Using resources and support systems, such as:  ? Online chats with a counselor.  ? Phone quitlines.  ? Printed self-help materials.  ? Support groups or group counseling.  ? Text messaging programs.  ? Mobile phone apps or applications.  · Taking medicines. Some of these medicines may have nicotine in them. If you are pregnant or breastfeeding, do not take any medicines to quit smoking unless your doctor says it is okay. Talk with your doctor about counseling or other things that can help you.  Talk with your doctor about using more than one strategy at the same time, such as taking medicines while you are also going to in-person counseling. This can help make  quitting easier.  What things can I do to make it easier to quit?  Quitting smoking might feel very hard at first, but there is a lot that you can do to make it easier. Take these steps:  · Talk to your family and friends. Ask them to support and encourage you.  · Call phone quitlines, reach out to support groups, or work with a counselor.  · Ask people who smoke to not smoke around you.  · Avoid places that make you want (trigger) to smoke, such as:  ? Bars.  ? Parties.  ? Smoke-break areas at work.  · Spend time with people who do not smoke.  · Lower the stress in your life. Stress can make you want to smoke. Try these things to help your stress:  ? Getting regular exercise.  ? Deep-breathing exercises.  ? Yoga.  ? Meditating.  ? Doing a body scan. To do this, close your eyes, focus on one area of your body at a time from head to toe, and notice which parts of your body are tense. Try to relax the muscles in those areas.  · Download or buy apps on your mobile phone or tablet that can help you stick to your quit plan. There are many free apps, such as QuitGuide from the CDC (Centers for Disease Control and Prevention). You can find more support from smokefree.gov and other websites.  This information is not intended to replace advice given to you by your health care provider. Make sure you discuss any questions you have with your health care provider.  Document Released: 10/14/2010 Document Revised: 08/15/2017 Document Reviewed: 05/03/2016  [employer] Patient Education © 2020 [employer]  How to Quarantine at Home  Information for Patients and Families    These instructions are for people with confirmed or suspected COVID-19 who do not need to be hospitalized and those with confirmed COVID-19 who were hospitalized and discharged to care for themselves at home.    If you were tested through the Health Department  The Health Department will monitor your wellbeing.  If it is determined that you do not need  to be hospitalized and can be isolated at home, you will be monitored by staff from your local or state health department.     If you were tested through a [facility]  You will need to monitor yourself and report changes in your symptoms to your doctor.  See the section below called Monitor Your Symptoms.    Follow these steps until a healthcare provider or local or state health department says you can return to your normal activities.    Stay home except to get medical care  • Restrict activities outside your home, except for getting medical care.   • Do not go to work, school, or public areas.   • Avoid using public transportation, ride-sharing, or taxis.    Separate yourself from other people and animals in your home  People  As much as possible, you should stay in a specific room and away from other people in your home. Also, you should use a separate bathroom, if available.    Animals  You should restrict contact with pets and other animals while you are sick with COVID-19, just like you would around other people. When possible, have another member of your household care for your animals while you are sick. If you are sick with COVID-19, avoid contact with your pet, including petting, snuggling, being kissed or licked, and sharing food. If you must care for your pet or be around animals while you are sick, wash your hands before and after you interact with pets and wear a facemask. See COVID-19 and Animals for more information.    Call ahead before visiting your doctor  If you have a medical appointment, call the healthcare provider and tell them that you have or may have COVID-19. This information will help the healthcare provider’s office take steps to keep other people from getting infected or exposed.    Wear a facemask  You should wear a facemask when you are around other people (e.g., sharing a room or vehicle) or pets and before you enter a healthcare provider’s office.     If you are not able to  wear a facemask (for example, because it causes trouble breathing), then people who live with you should not stay in the same room with you, or they should wear a facemask if they enter your room.    Cover your coughs and sneezes  • Cover your mouth and nose with a tissue when you cough or sneeze.   • Throw used tissues in a lined trash can.   • Immediately wash your hands with soap and water for at least 20 seconds or, if soap and water are not available, clean your hands with an alcohol-based hand  that contains at least 60% alcohol.    Clean your hands often  • Wash your hands often with soap and water for at least 20 seconds, especially after blowing your nose, coughing, or sneezing; going to the bathroom; and before eating or preparing food.     • If soap and water are not readily available, use an alcohol-based hand  with at least 60% alcohol, covering all surfaces of your hands and rubbing them together until they feel dry.    • Soap and water are the best option if hands are visibly dirty. Avoid touching your eyes, nose, and mouth with unwashed hands.    Avoid sharing personal household items  • You should not share dishes, drinking glasses, cups, eating utensils, towels, or bedding with other people or pets in your home.   • After using these items, they should be washed thoroughly with soap and water.    Clean all “high-touch” surfaces everyday  • High touch surfaces include counters, tabletops, doorknobs, bathroom fixtures, toilets, phones, keyboards, tablets, and bedside tables.   • Also, clean any surfaces that may have blood, stool, or body fluids on them.   • Use a household cleaning spray or wipe, according to the label instructions. Labels contain instructions for safe and effective use of the cleaning product, including precautions you should take when applying the product, such as wearing gloves and making sure you have good ventilation during use of the product.    Monitor your  symptoms  • Seek prompt medical attention if your illness is worsening (e.g., difficulty breathing).   • Before seeking care, call your healthcare provider and tell them that you have, or are being evaluated for, COVID-19.   • Put on a facemask before you enter the facility.     • These steps will help the healthcare provider’s office to keep other people in the office or waiting room from getting infected or exposed.   • Persons who are placed under active monitoring or facilitated self-monitoring should follow instructions provided by their local health department or occupational health professionals, as appropriate.  • If you have a medical emergency and need to call 911, notify the dispatch personnel that you have, or are being evaluated for COVID-19. If possible, put on a facemask before emergency medical services arrive.    Discontinuing home isolation  Patients with confirmed COVID-19 should remain under home isolation precautions until the risk of secondary transmission to others is thought to be low. The decision to discontinue home isolation precautions should be made on a case-by-case basis, in consultation with healthcare providers and state and local health departments.    The below content are for household members, intimate partners, and caregivers of a patient with symptomatic laboratory-confirmed COVID-19 or a patient under investigation:    Household members, intimate partners, and caregivers may have close contact with a person with symptomatic, laboratory-confirmed COVID-19 or a person under investigation.     Close contacts should monitor their health; they should call their healthcare provider right away if they develop symptoms suggestive of COVID-19 (e.g., fever, cough, shortness of breath)     Close contacts should also follow these recommendations:  • Make sure that you understand and can help the patient follow their healthcare provider’s instructions for medication(s) and care. You should  help the patient with basic needs in the home and provide support for getting groceries, prescriptions, and other personal needs.  • Monitor the patient’s symptoms. If the patient is getting sicker, call his or her healthcare provider and tell them that the patient has laboratory-confirmed COVID-19. This will help the healthcare provider’s office take steps to keep other people in the office or waiting room from getting infected. Ask the healthcare provider to call the local or [facility] health department for additional guidance. If the patient has a medical emergency and you need to call 911, notify the dispatch personnel that the patient has, or is being evaluated for COVID-19.  • Household members should stay in another room or be  from the patient as much as possible. Household members should use a separate bedroom and bathroom, if available.  • Prohibit visitors who do not have an essential need to be in the home.  • Household members should care for any pets in the home. Do not handle pets or other animals while sick.  For more information, see COVID-19 and Animals.  • Make sure that shared spaces in the home have good air flow, such as by an air conditioner or an opened window, weather permitting.  • Perform hand hygiene frequently. Wash your hands often with soap and water for at least 20 seconds or use an alcohol-based hand  that contains 60 to 95% alcohol, covering all surfaces of your hands and rubbing them together until they feel dry. Soap and water should be used preferentially if hands are visibly dirty.  • Avoid touching your eyes, nose, and mouth with unwashed hands.  • The patient should wear a facemask when you are around other people. If the patient is not able to wear a facemask (for example, because it causes trouble breathing), you, as the caregiver, should wear a mask when you are in the same room as the patient.  • Wear a disposable facemask and gloves when you touch or have  contact with the patient’s blood, stool, or body fluids, such as saliva, sputum, nasal mucus, vomit, or urine.   o Throw out disposable facemasks and gloves after using them. Do not reuse.  o When removing personal protective equipment, first remove and dispose of gloves. Then, immediately clean your hands with soap and water or alcohol-based hand . Next, remove and dispose of facemask, and immediately clean your hands again with soap and water or alcohol-based hand .  • Avoid sharing household items with the patient. You should not share dishes, drinking glasses, cups, eating utensils, towels, bedding, or other items. After the patient uses these items, you should wash them thoroughly (see below “Wash laundry thoroughly”).  • Clean all “high-touch” surfaces, such as counters, tabletops, doorknobs, bathroom fixtures, toilets, phones, keyboards, tablets, and bedside tables, every day. Also, clean any surfaces that may have blood, stool, or body fluids on them.   o Use a household cleaning spray or wipe, according to the label instructions. Labels contain instructions for safe and effective use of the cleaning product including precautions you should take when applying the product, such as wearing gloves and making sure you have good ventilation during use of the product.  • Wash laundry thoroughly.   o Immediately remove and wash clothes or bedding that have blood, stool, or body fluids on them.  o Wear disposable gloves while handling soiled items and keep soiled items away from your body. Clean your hands (with soap and water or an alcohol-based hand ) immediately after removing your gloves.  o Read and follow directions on labels of laundry or clothing items and detergent. In general, using a normal laundry detergent according to washing machine instructions and dry thoroughly using the warmest temperatures recommended on the clothing label.  • Place all used disposable gloves,  facemasks, and other contaminated items in a lined container before disposing of them with other household waste. Clean your hands (with soap and water or an alcohol-based hand ) immediately after handling these items. Soap and water should be used preferentially if hands are visibly dirty.  • Discuss any additional questions with your state or local health department or healthcare provider.    Adapted from information provided by the Centers for Disease Control and Prevention.  For more information, visit https://www.cdc.gov/coronavirus/2019-ncov/hcp/guidance-prevent-spread.htmlFat and Cholesterol Restricted Eating Plan  Getting too much fat and cholesterol in your diet may cause health problems. Choosing the right foods helps keep your fat and cholesterol at normal levels. This can keep you from getting certain diseases.  Your doctor may recommend an eating plan that includes:  · Total fat: ______% or less of total calories a day.  · Saturated fat: ______% or less of total calories a day.  · Cholesterol: less than _________mg a day.  · Fiber: ______g a day.  What are tips for following this plan?  Meal planning  · At meals, divide your plate into four equal parts:  ? Fill one-half of your plate with vegetables and green salads.  ? Fill one-fourth of your plate with whole grains.  ? Fill one-fourth of your plate with low-fat (lean) protein foods.  · Eat fish that is high in omega-3 fats at least two times a week. This includes mackerel, tuna, sardines, and salmon.  · Eat foods that are high in fiber, such as whole grains, beans, apples, broccoli, carrots, peas, and barley.  General tips    · Work with your doctor to lose weight if you need to.  · Avoid:  ? Foods with added sugar.  ? Fried foods.  ? Foods with partially hydrogenated oils.  · Limit alcohol intake to no more than 1 drink a day for nonpregnant women and 2 drinks a day for men. One drink equals 12 oz of beer, 5 oz of wine, or 1½ oz of hard  "liquor.  Reading food labels  · Check food labels for:  ? Trans fats.  ? Partially hydrogenated oils.  ? Saturated fat (g) in each serving.  ? Cholesterol (mg) in each serving.  ? Fiber (g) in each serving.  · Choose foods with healthy fats, such as:  ? Monounsaturated fats.  ? Polyunsaturated fats.  ? Omega-3 fats.  · Choose grain products that have whole grains. Look for the word \"whole\" as the first word in the ingredient list.  Cooking  · Cook foods using low-fat methods. These include baking, boiling, grilling, and broiling.  · Eat more home-cooked foods. Eat at restaurants and buffets less often.  · Avoid cooking using saturated fats, such as butter, cream, palm oil, palm kernel oil, and coconut oil.  Recommended foods    Fruits  · All fresh, canned (in natural juice), or frozen fruits.  Vegetables  · Fresh or frozen vegetables (raw, steamed, roasted, or grilled). Green salads.  Grains  · Whole grains, such as whole wheat or whole grain breads, crackers, cereals, and pasta. Unsweetened oatmeal, bulgur, barley, quinoa, or brown rice. Corn or whole wheat flour tortillas.  Meats and other protein foods  · Ground beef (85% or leaner), grass-fed beef, or beef trimmed of fat. Skinless chicken or turkey. Ground chicken or turkey. Pork trimmed of fat. All fish and seafood. Egg whites. Dried beans, peas, or lentils. Unsalted nuts or seeds. Unsalted canned beans. Nut butters without added sugar or oil.  Dairy  · Low-fat or nonfat dairy products, such as skim or 1% milk, 2% or reduced-fat cheeses, low-fat and fat-free ricotta or cottage cheese, or plain low-fat and nonfat yogurt.  Fats and oils  · Tub margarine without trans fats. Light or reduced-fat mayonnaise and salad dressings. Avocado. Olive, canola, sesame, or safflower oils.  The items listed above may not be a complete list of foods and beverages you can eat. Contact a dietitian for more information.  Foods to avoid  Fruits  · Canned fruit in heavy syrup. Fruit " in cream or butter sauce. Fried fruit.  Vegetables  · Vegetables cooked in cheese, cream, or butter sauce. Fried vegetables.  Grains  · White bread. White pasta. White rice. Cornbread. Bagels, pastries, and croissants. Crackers and snack foods that contain trans fat and hydrogenated oils.  Meats and other protein foods  · Fatty cuts of meat. Ribs, chicken wings, sanon, sausage, bologna, salami, chitterlings, fatback, hot dogs, bratwurst, and packaged lunch meats. Liver and organ meats. Whole eggs and egg yolks. Chicken and turkey with skin. Fried meat.  Dairy  · Whole or 2% milk, cream, half-and-half, and cream cheese. Whole milk cheeses. Whole-fat or sweetened yogurt. Full-fat cheeses. Nondairy creamers and whipped toppings. Processed cheese, cheese spreads, and cheese curds.  Beverages  · Alcohol. Sugar-sweetened drinks such as sodas, lemonade, and fruit drinks.  Fats and oils  · Butter, stick margarine, lard, shortening, ghee, or sanon fat. Coconut, palm kernel, and palm oils.  Sweets and desserts  · Corn syrup, sugars, honey, and molasses. Candy. Jam and jelly. Syrup. Sweetened cereals. Cookies, pies, cakes, donuts, muffins, and ice cream.  The items listed above may not be a complete list of foods and beverages you should avoid. Contact a dietitian for more information.  Summary  · Choosing the right foods helps keep your fat and cholesterol at normal levels. This can keep you from getting certain diseases.  · At meals, fill one-half of your plate with vegetables and green salads.  · Eat high-fiber foods, like whole grains, beans, apples, carrots, peas, and barley.  · Limit added sugar, saturated fats, alcohol, and fried foods.  This information is not intended to replace advice given to you by your health care provider. Make sure you discuss any questions you have with your health care provider.  Document Released: 06/18/2013 Document Revised: 08/21/2019 Document Reviewed: 09/04/2018  [employer]  "Patient Education © 2020 [employer]  BMI for Adults    Body mass index (BMI) is a number that is calculated from a person's weight and height. BMI may help to estimate how much of a person's weight is composed of fat. BMI can help identify those who may be at higher risk for certain medical problems.  How is BMI used with adults?  BMI is used as a screening tool to identify possible weight problems. It is used to check whether a person is obese, overweight, healthy weight, or underweight.  How is BMI calculated?  BMI measures your weight and compares it to your height. This can be done either in English ([place] or metric measurements. Note that charts are available to help you find your BMI quickly and easily without having to do these calculations yourself.  To calculate your BMI in English ([place] measurements, your health care provider will:  1. Measure your weight in pounds (lb).  2. Multiply the number of pounds by 703.  ? For example, for a person who weighs 180 lb, multiply that number by 703, which equals 126,540.  3. Measure your height in inches (in). Then multiply that number by itself to get a measurement called \"inches squared.\"  ? For example, for a person who is 70 in tall, the \"inches squared\" measurement is 70 in x 70 in, which equals 4900 inches squared.  4. Divide the total from Step 2 (number of lb x 703) by the total from Step 3 (inches squared): 126,540 ÷ 4900 = 25.8. This is your BMI.  To calculate your BMI in metric measurements, your health care provider will:  1. Measure your weight in kilograms (kg).  2. Measure your height in meters (m). Then multiply that number by itself to get a measurement called \"meters squared.\"  ? For example, for a person who is 1.75 m tall, the \"meters squared\" measurement is 1.75 m x 1.75 m, which is equal to 3.1 meters squared.  3. Divide the number of kilograms (your weight) by the meters squared number. In this example: 70 ÷ 3.1 = 22.6. This is your " BMI.  How is BMI interpreted?  To interpret your results, your health care provider will use BMI charts to identify whether you are underweight, normal weight, overweight, or obese. The following guidelines will be used:  · Underweight: BMI less than 18.5.  · Normal weight: BMI between 18.5 and 24.9.  · Overweight: BMI between 25 and 29.9.  · Obese: BMI of 30 and above.  Please note:  · Weight includes both fat and muscle, so someone with a muscular build, such as an athlete, may have a BMI that is higher than 24.9. In cases like these, BMI is not an accurate measure of body fat.  · To determine if excess body fat is the cause of a BMI of 25 or higher, further assessments may need to be done by a health care provider.  · BMI is usually interpreted in the same way for men and women.  Why is BMI a useful tool?  BMI is useful in two ways:  · Identifying a weight problem that may be related to a medical condition, or that may increase the risk for medical problems.  · Promoting lifestyle and diet changes in order to reach a healthy weight.  Summary  · Body mass index (BMI) is a number that is calculated from a person's weight and height.  · BMI may help to estimate how much of a person's weight is composed of fat. BMI can help identify those who may be at higher risk for certain medical problems.  · BMI can be measured using English measurements or metric measurements.  · To interpret your results, your health care provider will use BMI charts to identify whether you are underweight, normal weight, overweight, or obese.  This information is not intended to replace advice given to you by your health care provider. Make sure you discuss any questions you have with your health care provider.  Document Released: 08/29/2005 Document Revised: 10/31/2018 Document Reviewed: 10/31/2018  [employer] Interactive Patient Education © 2020 [employer]

## 2020-05-05 NOTE — PROGRESS NOTES
Subjective   Herber Stein is a 59 y.o. male     Chief Complaint   Patient presents with   • Follow-up   • Coronary Artery Disease     You have chosen to receive care through a telephone visit. Do you consent to use a telephone visit for your medical care today? Yes    HPI    Problem List:    1.) CAD status post stenting to RCA in 2012 with bare metal stent   a. Stress in 2014 showing posterolateral infarct with minimal martine-infarct ischemia.    b. Preserved Systolic fxn  2.) HTN  3.) Diabetes mellitus  4.) Shortness of breath  5.) Normal LV systolic function with EF 60%  5.1) Echo 11/10/14 - mild LVH; EF 50-55%; DD I; basal inferolateral wall segment is hypokinetic, trace AR, TR, MR and AK  6) Smoking Habituation, Cigars    Patient is a 59-year-old male who presents today via telephone visit and replacement of office visit due to COVID19.  He denies any chest pain, pressure, palpitations, fluttering, dizziness, presyncope, syncope, orthopnea, PND or edema.  He denies any shortness of breath with activity.  He says he tries to walk at least a mile to mile and a half almost every day.  He says he is also active otherwise.  He also helps take care of his father who has Alzheimer's disease.  He says he only gets fatigued if he really pushes himself.  He smokes maybe 2 cigars a day.  PCP does monitor his cholesterol.    Current Outpatient Medications on File Prior to Visit   Medication Sig Dispense Refill   • cetirizine (ZyrTEC) 10 MG tablet Take 10 mg by mouth Daily.     • Dapagliflozin Propanediol (FARXIGA) 10 MG tablet Take 10 mg by mouth Daily.     • metFORMIN (GLUCOPHAGE) 500 MG tablet Take 500 mg by mouth 2 (Two) Times a Day. 2 tablets twice a day     • nitroglycerin (NITROSTAT) 0.4 MG SL tablet 1 tab under the tongue q 5 min, up to 3x, for CP. Go to ER if it doesn't work. 25 tablet 2   • [DISCONTINUED] amLODIPine-benazepril (LOTREL 5-20) 5-20 MG per capsule Take 1 capsule by mouth Daily. 90 capsule 3   •  [DISCONTINUED] aspirin (ASPIRIN LOW DOSE) 81 MG EC tablet Take 1 tablet by mouth Daily. 30 tablet 11   • [DISCONTINUED] atenolol (TENORMIN) 25 MG tablet Take 1 tablet by mouth Daily. 90 tablet 3   • [DISCONTINUED] atorvastatin (LIPITOR) 40 MG tablet Take 1 tablet by mouth Daily. 90 tablet 3   • [DISCONTINUED] clopidogrel (PLAVIX) 75 MG tablet Take 1 tablet by mouth Daily. 90 tablet 3   • [DISCONTINUED] Omega-3 Fatty Acids (FISH OIL) 1000 MG capsule capsule Take 1,000 mg by mouth Daily.       No current facility-administered medications on file prior to visit.        ALLERGIES    Patient has no known allergies.    Past Medical History:   Diagnosis Date   • Arthritis    • Atherosclerotic heart disease of native coronary artery without angina pectoris    • Chest pain 8/31/2016   • Diabetes mellitus (CMS/HCC) 8/31/2016   • Heart disease 8/31/2016   • HTN (hypertension) 8/31/2016   • Hyperlipidemia    • Migraine headache    • Obesity 8/31/2016   • Sleep apnea    • Umbilical hernia        Social History     Socioeconomic History   • Marital status: Single     Spouse name: Not on file   • Number of children: Not on file   • Years of education: Not on file   • Highest education level: Not on file   Tobacco Use   • Smoking status: Current Some Day Smoker     Types: Cigars   • Smokeless tobacco: Never Used   Substance and Sexual Activity   • Alcohol use: Yes     Comment: 6 pack per month   • Drug use: No   • Sexual activity: Defer       Family History   Problem Relation Age of Onset   • Stroke Mother    • Diabetes Mother    • Heart attack Mother    • Other Mother         CABG, Arotic Valve Replacement   • Hypertension Father    • Heart attack Brother        Review of Systems   Constitutional: Positive for fatigue (States he's fatigued if he pushes himself too hard ). Negative for diaphoresis.   HENT: Negative for congestion, rhinorrhea and sore throat.    Eyes: Positive for visual disturbance (glasses daily ).   Respiratory:  "Negative for chest tightness and shortness of breath (walks 1 - 1 1/2 miles almost daily, no issues ).    Cardiovascular: Negative for chest pain (Denies CP), palpitations and leg swelling.   Gastrointestinal: Negative for abdominal pain, blood in stool, constipation, diarrhea, nausea and vomiting.   Endocrine: Negative for cold intolerance and heat intolerance.   Genitourinary: Negative for difficulty urinating, dysuria, frequency, hematuria and urgency.   Musculoskeletal: Positive for arthralgias. Negative for back pain and neck pain.   Skin: Negative for rash and wound.   Allergic/Immunologic: Positive for environmental allergies (seasonal ). Negative for food allergies.   Neurological: Negative for dizziness, syncope, weakness, light-headedness, numbness and headaches.   Hematological: Does not bruise/bleed easily.   Psychiatric/Behavioral: Positive for sleep disturbance (Sometimes arthritis pains keep him awake ).       Objective   /69   Pulse 74   Ht 180.3 cm (71\")   Wt 111 kg (245 lb) Comment: stated  BMI 34.17 kg/m²   Vitals:    05/05/20 0740   BP: 113/69   Pulse: 74   Weight: 111 kg (245 lb)   Height: 180.3 cm (71\")      Lab Results (most recent)     None        Physical Exam   Psychiatric: He has a normal mood and affect. His speech is normal and behavior is normal. Judgment and thought content normal. Cognition and memory are normal.   Vitals reviewed.      Procedure   Procedures         Assessment/Plan      Diagnosis Plan   1. Coronary artery disease involving native coronary artery of native heart without angina pectoris  atorvastatin (LIPITOR) 40 MG tablet    clopidogrel (PLAVIX) 75 MG tablet    aspirin (ASPIRIN LOW DOSE) 81 MG EC tablet   2. Essential hypertension  amLODIPine-benazepril (LOTREL 5-20) 5-20 MG per capsule    atenolol (TENORMIN) 25 MG tablet   3. Hyperlipidemia, unspecified hyperlipidemia type  atorvastatin (LIPITOR) 40 MG tablet   4. Smoking         Return in about 6 months " (around 11/5/2020).    CAD-patient's on aspirin, beta and statin.  Hypertension-patient's on Lotrel and atenolol.  Hyperlipidemia-patient's on Lipitor monitored by PCP.  He will continue his medication regimen.  He will follow-up in 6 months or sooner if any changes.       Herber Stein  reports that he has been smoking cigars. He has never used smokeless tobacco.. I have educated him on the risk of diseases from using tobacco products such as cancer, COPD and heart diease.     I advised him to quit and he is not willing to quit.      Patient's Body mass index is 34.17 kg/m². BMI is above normal parameters. Recommendations include: educational material.    This visit has been rescheduled as a phone visit to comply with patient safety concerns in accordance with CDC recommendations. Total time of discussion was 5 minutes.        Electronically signed by:

## 2020-10-30 ENCOUNTER — TELEPHONE (OUTPATIENT)
Dept: CARDIOLOGY | Facility: CLINIC | Age: 60
End: 2020-10-30

## 2021-03-03 ENCOUNTER — OFFICE VISIT (OUTPATIENT)
Dept: CARDIOLOGY | Facility: CLINIC | Age: 61
End: 2021-03-03

## 2021-03-03 VITALS
DIASTOLIC BLOOD PRESSURE: 87 MMHG | SYSTOLIC BLOOD PRESSURE: 150 MMHG | OXYGEN SATURATION: 97 % | WEIGHT: 238 LBS | TEMPERATURE: 98.7 F | HEART RATE: 72 BPM | BODY MASS INDEX: 33.32 KG/M2 | HEIGHT: 71 IN

## 2021-03-03 DIAGNOSIS — I10 ESSENTIAL HYPERTENSION: ICD-10-CM

## 2021-03-03 DIAGNOSIS — I25.10 CORONARY ARTERY DISEASE INVOLVING NATIVE CORONARY ARTERY OF NATIVE HEART WITHOUT ANGINA PECTORIS: Primary | ICD-10-CM

## 2021-03-03 DIAGNOSIS — E78.5 HYPERLIPIDEMIA, UNSPECIFIED HYPERLIPIDEMIA TYPE: ICD-10-CM

## 2021-03-03 DIAGNOSIS — R06.02 SHORTNESS OF BREATH: ICD-10-CM

## 2021-03-03 DIAGNOSIS — F17.200 SMOKING: ICD-10-CM

## 2021-03-03 PROCEDURE — 99214 OFFICE O/P EST MOD 30 MIN: CPT | Performed by: NURSE PRACTITIONER

## 2021-03-03 PROCEDURE — 93000 ELECTROCARDIOGRAM COMPLETE: CPT | Performed by: NURSE PRACTITIONER

## 2021-03-03 RX ORDER — ASPIRIN 81 MG/1
81 TABLET ORAL DAILY
Qty: 90 TABLET | Refills: 3 | Status: SHIPPED | OUTPATIENT
Start: 2021-03-03 | End: 2022-01-07 | Stop reason: SDUPTHER

## 2021-03-03 RX ORDER — CLOPIDOGREL BISULFATE 75 MG/1
75 TABLET ORAL DAILY
Qty: 90 TABLET | Refills: 3 | Status: SHIPPED | OUTPATIENT
Start: 2021-03-03 | End: 2022-01-07 | Stop reason: SDUPTHER

## 2021-03-03 RX ORDER — ATORVASTATIN CALCIUM 40 MG/1
40 TABLET, FILM COATED ORAL DAILY
Qty: 90 TABLET | Refills: 3 | Status: SHIPPED | OUTPATIENT
Start: 2021-03-03 | End: 2022-01-07 | Stop reason: SDUPTHER

## 2021-03-03 RX ORDER — AMLODIPINE BESYLATE AND BENAZEPRIL HYDROCHLORIDE 5; 10 MG/1; MG/1
1 CAPSULE ORAL DAILY
Qty: 90 CAPSULE | Refills: 3 | Status: SHIPPED | OUTPATIENT
Start: 2021-03-03 | End: 2022-01-07 | Stop reason: SDUPTHER

## 2021-03-03 RX ORDER — ATENOLOL 25 MG/1
25 TABLET ORAL DAILY
Qty: 90 TABLET | Refills: 3 | Status: SHIPPED | OUTPATIENT
Start: 2021-03-03 | End: 2022-01-07 | Stop reason: SDUPTHER

## 2021-03-03 NOTE — PROGRESS NOTES
Subjective   Herber Stein is a 60 y.o. male     Chief Complaint   Patient presents with   • Follow-up   • Coronary Artery Disease       HPI    Problem List:    1.) CAD status post stenting to RCA in 2012 with bare metal stent   a. Stress in 2014 showing posterolateral infarct with minimal martine-infarct ischemia.    b. Preserved Systolic fxn  2.) HTN  3.) Diabetes mellitus  4.) Shortness of breath  5.) Normal LV systolic function with EF 60%  5.1) Echo 11/10/14 - mild LVH; EF 50-55%; DD I; basal inferolateral wall segment is hypokinetic, trace AR, TR, MR and IL  6) Smoking Habituation, Cigars    Patient is a 60-year-old male who presents today for follow-up.  He is denies any chest pain, pressure, palpitations, fluttering, dizziness, presyncope, syncope, orthopnea, PND or edema.  He says the only time he has shortness of breath if he overdoes it.  He says that the reason why his blood pressure is elevated today is because his back is killing him from driving here as he aggravated it yesterday.  He said that when he had a stent placed back in 2012 he had significant shortness of breath and fatigue.  He says he has not had any of those symptoms.  He says he is really been trying to take care of himself.  He says his dad passed away just a few months ago.  He did have blood work with PCP a couple months ago.  Patient still smokes cigars.    Current Outpatient Medications on File Prior to Visit   Medication Sig Dispense Refill   • cetirizine (ZyrTEC) 10 MG tablet Take 10 mg by mouth Daily.     • Dapagliflozin Propanediol (FARXIGA) 10 MG tablet Take 10 mg by mouth Daily.     • metFORMIN (GLUCOPHAGE) 500 MG tablet Take 500 mg by mouth 2 (Two) Times a Day. 2 tablets twice a day     • nitroglycerin (NITROSTAT) 0.4 MG SL tablet 1 tab under the tongue q 5 min, up to 3x, for CP. Go to ER if it doesn't work. 25 tablet 2   • Omega-3 Fatty Acids (FISH OIL) 1000 MG capsule capsule Take 1 capsule by mouth Daily. 90 capsule 3   •  [DISCONTINUED] amLODIPine-benazepril (LOTREL 5-20) 5-20 MG per capsule Take 1 capsule by mouth Daily. (Patient taking differently: Take 1 capsule by mouth Daily. 5/10 mg) 90 capsule 3   • [DISCONTINUED] aspirin (ASPIRIN LOW DOSE) 81 MG EC tablet Take 1 tablet by mouth Daily. 90 tablet 3   • [DISCONTINUED] atenolol (TENORMIN) 25 MG tablet Take 1 tablet by mouth Daily. 90 tablet 3   • [DISCONTINUED] atorvastatin (LIPITOR) 40 MG tablet Take 1 tablet by mouth Daily. 90 tablet 3   • [DISCONTINUED] clopidogrel (PLAVIX) 75 MG tablet Take 1 tablet by mouth Daily. 90 tablet 3     No current facility-administered medications on file prior to visit.        ALLERGIES    Patient has no known allergies.    Past Medical History:   Diagnosis Date   • Arthritis    • Atherosclerotic heart disease of native coronary artery without angina pectoris    • Chest pain 8/31/2016   • Diabetes mellitus (CMS/HCC) 8/31/2016   • Heart disease 8/31/2016   • HTN (hypertension) 8/31/2016   • Hyperlipidemia    • Migraine headache    • Obesity 8/31/2016   • Sleep apnea    • Umbilical hernia        Social History     Socioeconomic History   • Marital status: Single     Spouse name: Not on file   • Number of children: Not on file   • Years of education: Not on file   • Highest education level: Not on file   Tobacco Use   • Smoking status: Current Some Day Smoker     Types: Cigars   • Smokeless tobacco: Never Used   Substance and Sexual Activity   • Alcohol use: Yes     Comment: 6 pack per month   • Drug use: No   • Sexual activity: Defer       Family History   Problem Relation Age of Onset   • Stroke Mother    • Diabetes Mother    • Heart attack Mother    • Other Mother         CABG, Arotic Valve Replacement   • Hypertension Father    • Heart attack Brother        Review of Systems   Constitutional: Negative for appetite change, chills, diaphoresis, fatigue and fever.   HENT: Negative for congestion, rhinorrhea and sore throat.    Eyes: Positive for  "visual disturbance (glasses).   Respiratory: Positive for shortness of breath (if over does it only ). Negative for cough, chest tightness and wheezing.    Cardiovascular: Negative for chest pain, palpitations and leg swelling.   Gastrointestinal: Negative for abdominal pain, blood in stool, constipation, diarrhea, nausea and vomiting.   Endocrine: Positive for cold intolerance (cooler more than hot ). Negative for heat intolerance.   Genitourinary: Negative for difficulty urinating, dysuria, frequency, hematuria and urgency.   Musculoskeletal: Positive for arthralgias and back pain (lower back; hurt it worse yesterday, has spurs also ). Negative for joint swelling, neck pain and neck stiffness.   Skin: Negative for rash and wound.   Allergic/Immunologic: Positive for environmental allergies (seasonal ). Negative for food allergies.   Neurological: Negative for dizziness, weakness, light-headedness, numbness and headaches.   Hematological: Bruises/bleeds easily (bleeds ).   Psychiatric/Behavioral: Negative for sleep disturbance.       Objective   /87   Pulse 72   Temp 98.7 °F (37.1 °C)   Ht 180.3 cm (71\")   Wt 108 kg (238 lb)   SpO2 97%   BMI 33.19 kg/m²   Vitals:    03/03/21 1445   BP: 150/87   Pulse: 72   Temp: 98.7 °F (37.1 °C)   SpO2: 97%   Weight: 108 kg (238 lb)   Height: 180.3 cm (71\")      Lab Results (most recent)     None        Physical Exam  Vitals signs reviewed.   Constitutional:       General: He is awake.      Appearance: Normal appearance. He is well-developed and well-groomed. He is obese.   HENT:      Head: Normocephalic.   Eyes:      General: Lids are normal.      Comments: Wears glasses    Neck:      Vascular: No carotid bruit, hepatojugular reflux or JVD.   Cardiovascular:      Rate and Rhythm: Normal rate and regular rhythm.      Pulses:           Radial pulses are 2+ on the right side and 2+ on the left side.        Dorsalis pedis pulses are 2+ on the right side and 2+ on the left " side.        Posterior tibial pulses are 2+ on the right side and 2+ on the left side.      Heart sounds: Normal heart sounds.   Pulmonary:      Effort: Pulmonary effort is normal.      Breath sounds: Normal breath sounds and air entry.   Abdominal:      General: Bowel sounds are normal.      Palpations: Abdomen is soft.   Musculoskeletal:      Right lower leg: No edema.      Left lower leg: Edema (trace) present.   Skin:     General: Skin is warm and dry.   Neurological:      Mental Status: He is alert and oriented to person, place, and time.   Psychiatric:         Attention and Perception: Attention and perception normal.         Mood and Affect: Mood and affect normal.         Speech: Speech normal.         Behavior: Behavior normal. Behavior is cooperative.         Thought Content: Thought content normal.         Cognition and Memory: Cognition and memory normal.         Judgment: Judgment normal.         Procedure     ECG 12 Lead    Date/Time: 3/3/2021 3:13 PM  Performed by: Patricia Newman APRN  Authorized by: Patricia Newman APRN   Comparison: compared with previous ECG from 2/21/2019  Similar to previous ECG  Rhythm: sinus rhythm  Rate: normal  BPM: 68  Conduction: incomplete right bundle branch block  QRS axis: normal  Other findings: low voltage    Clinical impression: abnormal EKG                 Assessment/Plan      Diagnosis Plan   1. Coronary artery disease involving native coronary artery of native heart without angina pectoris  ECG 12 Lead    atorvastatin (LIPITOR) 40 MG tablet    clopidogrel (PLAVIX) 75 MG tablet    aspirin (aspirin) 81 MG EC tablet   2. Essential hypertension  ECG 12 Lead    atenolol (TENORMIN) 25 MG tablet    amLODIPine-benazepril (Lotrel) 5-10 MG per capsule   3. Hyperlipidemia, unspecified hyperlipidemia type  atorvastatin (LIPITOR) 40 MG tablet   4. Smoking     5. Shortness of breath         Return in about 6 months (around 9/3/2021).       CAD-patient's on aspirin, statin,  beta and Plavix.  Hypertension-patient's doing well on atenolol.  Hyperlipidemia-patient is on Lipitor and doing well.  Shortness of breath-stable.  Patient will continue his medication regimen.  He will follow-up in 6 months or sooner if any changes.    Herber Stein  reports that he has been smoking cigars. He has never used smokeless tobacco.. I have educated him on the risk of diseases from using tobacco products such as cancer, COPD and heart disease.     I advised him to quit and he is not willing to quit.    I spent 3  minutes counseling the patient.         Patient's Body mass index is 33.19 kg/m². BMI is above normal parameters. Recommendations include: educational material.      Electronically signed by:

## 2021-03-03 NOTE — PATIENT INSTRUCTIONS
Steps to Quit Smoking  Smoking tobacco is the leading cause of preventable death. It can affect almost every organ in the body. Smoking puts you and those around you at risk for developing many serious chronic diseases. Quitting smoking can be difficult, but it is one of the best things that you can do for your health. It is never too late to quit.  How do I get ready to quit?  When you decide to quit smoking, create a plan to help you succeed. Before you quit:  · Pick a date to quit. Set a date within the next 2 weeks to give you time to prepare.  · Write down the reasons why you are quitting. Keep this list in places where you will see it often.  · Tell your family, friends, and co-workers that you are quitting. Support from your loved ones can make quitting easier.  · Talk with your health care provider about your options for quitting smoking.  · Find out what treatment options are covered by your health insurance.  · Identify people, places, things, and activities that make you want to smoke (triggers). Avoid them.  What first steps can I take to quit smoking?  · Throw away all cigarettes at home, at work, and in your car.  · Throw away smoking accessories, such as ashtrays and lighters.  · Clean your car. Make sure to empty the ashtray.  · Clean your home, including curtains and carpets.  What strategies can I use to quit smoking?  Talk with your health care provider about combining strategies, such as taking medicines while you are also receiving in-person counseling. Using these two strategies together makes you more likely to succeed in quitting than if you used either strategy on its own.  · If you are pregnant or breastfeeding, talk with your health care provider about finding counseling or other support strategies to quit smoking. Do not take medicine to help you quit smoking unless your health care provider tells you to do so.  To quit smoking:  Quit right away  · Quit smoking completely, instead of  gradually reducing how much you smoke over a period of time. Research shows that stopping smoking right away is more successful than gradually quitting.  · Attend in-person counseling to help you build problem-solving skills. You are more likely to succeed in quitting if you attend counseling sessions regularly. Even short sessions of 10 minutes can be effective.  Take medicine  You may take medicines to help you quit smoking. Some medicines require a prescription and some you can purchase over-the-counter. Medicines may have nicotine in them to replace the nicotine in cigarettes. Medicines may:  · Help to stop cravings.  · Help to relieve withdrawal symptoms.  Your health care provider may recommend:  · Nicotine patches, gum, or lozenges.  · Nicotine inhalers or sprays.  · Non-nicotine medicine that is taken by mouth.  Find resources  Find resources and support systems that can help you to quit smoking and remain smoke-free after you quit. These resources are most helpful when you use them often. They include:  · Online chats with a counselor.  · Telephone quitlines.  · Printed self-help materials.  · Support groups or group counseling.  · Text messaging programs.  · Mobile phone apps or applications. Use apps that can help you stick to your quit plan by providing reminders, tips, and encouragement. There are many free apps for mobile devices as well as websites. Examples include Quit Guide from the CDC and smokefree.gov  What things can I do to make it easier to quit?    · Reach out to your family and friends for support and encouragement. Call telephone quitlines (0-723-QUIT-NOW), reach out to support groups, or work with a counselor for support.  · Ask people who smoke to avoid smoking around you.  · Avoid places that trigger you to smoke, such as bars, parties, or smoke-break areas at work.  · Spend time with people who do not smoke.  · Lessen the stress in your life. Stress can be a smoking trigger for some  people. To lessen stress, try:  ? Exercising regularly.  ? Doing deep-breathing exercises.  ? Doing yoga.  ? Meditating.  ? Performing a body scan. This involves closing your eyes, scanning your body from head to toe, and noticing which parts of your body are particularly tense. Try to relax the muscles in those areas.  How will I feel when I quit smoking?  Day 1 to 3 weeks  Within the first 24 hours of quitting smoking, you may start to feel withdrawal symptoms. These symptoms are usually most noticeable 2-3 days after quitting, but they usually do not last for more than 2-3 weeks. You may experience these symptoms:  · Mood swings.  · Restlessness, anxiety, or irritability.  · Trouble concentrating.  · Dizziness.  · Strong cravings for sugary foods and nicotine.  · Mild weight gain.  · Constipation.  · Nausea.  · Coughing or a sore throat.  · Changes in how the medicines that you take for unrelated issues work in your body.  · Depression.  · Trouble sleeping (insomnia).  Week 3 and afterward  After the first 2-3 weeks of quitting, you may start to notice more positive results, such as:  · Improved sense of smell and taste.  · Decreased coughing and sore throat.  · Slower heart rate.  · Lower blood pressure.  · Clearer skin.  · The ability to breathe more easily.  · Fewer sick days.  Quitting smoking can be very challenging. Do not get discouraged if you are not successful the first time. Some people need to make many attempts to quit before they achieve long-term success. Do your best to stick to your quit plan, and talk with your health care provider if you have any questions or concerns.  Summary  · Smoking tobacco is the leading cause of preventable death. Quitting smoking is one of the best things that you can do for your health.  · When you decide to quit smoking, create a plan to help you succeed.  · Quit smoking right away, not slowly over a period of time.  · When you start quitting, seek help from your  health care provider, family, or friends.  This information is not intended to replace advice given to you by your health care provider. Make sure you discuss any questions you have with your health care provider.  Document Revised: 09/11/2020 Document Reviewed: 03/07/2020  Elsevier Patient Education © 2020 [employer]  BMI for Adults  What is BMI?  Body mass index (BMI) is a number that is calculated from a person's weight and height. BMI can help estimate how much of a person's weight is composed of fat. BMI does not measure body fat directly. Rather, it is an alternative to procedures that directly measure body fat, which can be difficult and expensive.  BMI can help identify people who may be at higher risk for certain medical problems.  What are BMI measurements used for?  BMI is used as a screening tool to identify possible weight problems. It helps determine whether a person is obese, overweight, a healthy weight, or underweight.  BMI is useful for:  · Identifying a weight problem that may be related to a medical condition or may increase the risk for medical problems.  · Promoting changes, such as changes in diet and exercise, to help reach a healthy weight. BMI screening can be repeated to see if these changes are working.  How is BMI calculated?  BMI involves measuring your weight in relation to your height. Both height and weight are measured, and the BMI is calculated from those numbers. This can be done either in English ([place] or metric measurements. Note that charts and online BMI calculators are available to help you find your BMI quickly and easily without having to do these calculations yourself.  To calculate your BMI in English ([place] measurements:    1. Measure your weight in pounds (lb).  2. Multiply the number of pounds by 703.  ? For example, for a person who weighs 180 lb, multiply that number by 703, which equals 126,540.  3. Measure your height in inches. Then multiply that number by  "itself to get a measurement called \"inches squared.\"  ? For example, for a person who is 70 inches tall, the \"inches squared\" measurement is 70 inches x 70 inches, which equals 4,900 inches squared.  4. Divide the total from step 2 (number of lb x 703) by the total from step 3 (inches squared): 126,540 ÷ 4,900 = 25.8. This is your BMI.  To calculate your BMI in metric measurements:  1. Measure your weight in kilograms (kg).  2. Measure your height in meters (m). Then multiply that number by itself to get a measurement called \"meters squared.\"  ? For example, for a person who is 1.75 m tall, the \"meters squared\" measurement is 1.75 m x 1.75 m, which is equal to 3.1 meters squared.  3. Divide the number of kilograms (your weight) by the meters squared number. In this example: 70 ÷ 3.1 = 22.6. This is your BMI.  What do the results mean?  BMI charts are used to identify whether you are underweight, normal weight, overweight, or obese. The following guidelines will be used:  · Underweight: BMI less than 18.5.  · Normal weight: BMI between 18.5 and 24.9.  · Overweight: BMI between 25 and 29.9.  · Obese: BMI of 30 or above.  Keep these notes in mind:  · Weight includes both fat and muscle, so someone with a muscular build, such as an athlete, may have a BMI that is higher than 24.9. In cases like these, BMI is not an accurate measure of body fat.  · To determine if excess body fat is the cause of a BMI of 25 or higher, further assessments may need to be done by a health care provider.  · BMI is usually interpreted in the same way for men and women.  Where to find more information  For more information about BMI, including tools to quickly calculate your BMI, go to these websites:  · Centers for Disease Control and Prevention: www.cdc.gov  · American Heart Association: www.heart.org  · National Heart, Lung, and Blood [place]: www.nhlbi.nih.gov  Summary  · Body mass index (BMI) is a number that is calculated from a " "person's weight and height.  · BMI may help estimate how much of a person's weight is composed of fat. BMI can help identify those who may be at higher risk for certain medical problems.  · BMI can be measured using English measurements or metric measurements.  · BMI charts are used to identify whether you are underweight, normal weight, overweight, or obese.  This information is not intended to replace advice given to you by your health care provider. Make sure you discuss any questions you have with your health care provider.  Document Revised: 09/09/2020 Document Reviewed: 07/17/2020  Elsevier Patient Education © 2020 [employer]    Hypertension, Adult  High blood pressure (hypertension) is when the force of blood pumping through the arteries is too strong. The arteries are the blood vessels that carry blood from the heart throughout the body. Hypertension forces the heart to work harder to pump blood and may cause arteries to become narrow or stiff. Untreated or uncontrolled hypertension can cause a heart attack, heart failure, a stroke, kidney disease, and other problems.  A blood pressure reading consists of a higher number over a lower number. Ideally, your blood pressure should be below 120/80. The first (\"top\") number is called the systolic pressure. It is a measure of the pressure in your arteries as your heart beats. The second (\"bottom\") number is called the diastolic pressure. It is a measure of the pressure in your arteries as the heart relaxes.  What are the causes?  The exact cause of this condition is not known. There are some conditions that result in or are related to high blood pressure.  What increases the risk?  Some risk factors for high blood pressure are under your control. The following factors may make you more likely to develop this condition:  · Smoking.  · Having type 2 diabetes mellitus, high cholesterol, or both.  · Not getting enough exercise or physical activity.  · Being " overweight.  · Having too much fat, sugar, calories, or salt (sodium) in your diet.  · Drinking too much alcohol.  Some risk factors for high blood pressure may be difficult or impossible to change. Some of these factors include:  · Having chronic kidney disease.  · Having a family history of high blood pressure.  · Age. Risk increases with age.  · Race. You may be at higher risk if you are .  · Gender. Men are at higher risk than women before age 45. After age 65, women are at higher risk than men.  · Having obstructive sleep apnea.  · Stress.  What are the signs or symptoms?  High blood pressure may not cause symptoms. Very high blood pressure (hypertensive crisis) may cause:  · Headache.  · Anxiety.  · Shortness of breath.  · Nosebleed.  · Nausea and vomiting.  · Vision changes.  · Severe chest pain.  · Seizures.  How is this diagnosed?  This condition is diagnosed by measuring your blood pressure while you are seated, with your arm resting on a flat surface, your legs uncrossed, and your feet flat on the floor. The cuff of the blood pressure monitor will be placed directly against the skin of your upper arm at the level of your heart. It should be measured at least twice using the same arm. Certain conditions can cause a difference in blood pressure between your right and left arms.  Certain factors can cause blood pressure readings to be lower or higher than normal for a short period of time:  · When your blood pressure is higher when you are in a health care provider's office than when you are at home, this is called white coat hypertension. Most people with this condition do not need medicines.  · When your blood pressure is higher at home than when you are in a health care provider's office, this is called masked hypertension. Most people with this condition may need medicines to control blood pressure.  If you have a high blood pressure reading during one visit or you have normal blood  pressure with other risk factors, you may be asked to:  · Return on a different day to have your blood pressure checked again.  · Monitor your blood pressure at home for 1 week or longer.  If you are diagnosed with hypertension, you may have other blood or imaging tests to help your health care provider understand your overall risk for other conditions.  How is this treated?  This condition is treated by making healthy lifestyle changes, such as eating healthy foods, exercising more, and reducing your alcohol intake. Your health care provider may prescribe medicine if lifestyle changes are not enough to get your blood pressure under control, and if:  · Your systolic blood pressure is above 130.  · Your diastolic blood pressure is above 80.  Your personal target blood pressure may vary depending on your medical conditions, your age, and other factors.  Follow these instructions at home:  Eating and drinking    · Eat a diet that is high in fiber and potassium, and low in sodium, added sugar, and fat. An example eating plan is called the DASH (Dietary Approaches to Stop Hypertension) diet. To eat this way:  ? Eat plenty of fresh fruits and vegetables. Try to fill one half of your plate at each meal with fruits and vegetables.  ? Eat whole grains, such as whole-wheat pasta, brown rice, or whole-grain bread. Fill about one fourth of your plate with whole grains.  ? Eat or drink low-fat dairy products, such as skim milk or low-fat yogurt.  ? Avoid fatty cuts of meat, processed or cured meats, and poultry with skin. Fill about one fourth of your plate with lean proteins, such as fish, chicken without skin, beans, eggs, or tofu.  ? Avoid pre-made and processed foods. These tend to be higher in sodium, added sugar, and fat.  · Reduce your daily sodium intake. Most people with hypertension should eat less than 1,500 mg of sodium a day.  · Do not drink alcohol if:  ? Your health care provider tells you not to drink.  ? You are  pregnant, may be pregnant, or are planning to become pregnant.  · If you drink alcohol:  ? Limit how much you use to:  § 0-1 drink a day for women.  § 0-2 drinks a day for men.  ? Be aware of how much alcohol is in your drink. In the [place] one drink equals one 12 oz bottle of beer (355 mL), one 5 oz glass of wine (148 mL), or one 1½ oz glass of hard liquor (44 mL).  Lifestyle    · Work with your health care provider to maintain a healthy body weight or to lose weight. Ask what an ideal weight is for you.  · Get at least 30 minutes of exercise most days of the week. Activities may include walking, swimming, or biking.  · Include exercise to strengthen your muscles (resistance exercise), such as Pilates or lifting weights, as part of your weekly exercise routine. Try to do these types of exercises for 30 minutes at least 3 days a week.  · Do not use any products that contain nicotine or tobacco, such as cigarettes, e-cigarettes, and chewing tobacco. If you need help quitting, ask your health care provider.  · Monitor your blood pressure at home as told by your health care provider.  · Keep all follow-up visits as told by your health care provider. This is important.  Medicines  · Take over-the-counter and prescription medicines only as told by your health care provider. Follow directions carefully. Blood pressure medicines must be taken as prescribed.  · Do not skip doses of blood pressure medicine. Doing this puts you at risk for problems and can make the medicine less effective.  · Ask your health care provider about side effects or reactions to medicines that you should watch for.  Contact a health care provider if you:  · Think you are having a reaction to a medicine you are taking.  · Have headaches that keep coming back (recurring).  · Feel dizzy.  · Have swelling in your ankles.  · Have trouble with your vision.  Get help right away if you:  · Develop a severe headache or confusion.  · Have unusual weakness or  numbness.  · Feel faint.  · Have severe pain in your chest or abdomen.  · Vomit repeatedly.  · Have trouble breathing.  Summary  · Hypertension is when the force of blood pumping through your arteries is too strong. If this condition is not controlled, it may put you at risk for serious complications.  · Your personal target blood pressure may vary depending on your medical conditions, your age, and other factors. For most people, a normal blood pressure is less than 120/80.  · Hypertension is treated with lifestyle changes, medicines, or a combination of both. Lifestyle changes include losing weight, eating a healthy, low-sodium diet, exercising more, and limiting alcohol.  This information is not intended to replace advice given to you by your health care provider. Make sure you discuss any questions you have with your health care provider.  Document Revised: 08/28/2019 Document Reviewed: 08/28/2019  Elsevier Patient Education © 2020 [employer]

## 2022-01-07 ENCOUNTER — TELEMEDICINE (OUTPATIENT)
Dept: CARDIOLOGY | Facility: CLINIC | Age: 62
End: 2022-01-07

## 2022-01-07 VITALS
DIASTOLIC BLOOD PRESSURE: 85 MMHG | SYSTOLIC BLOOD PRESSURE: 135 MMHG | BODY MASS INDEX: 33.32 KG/M2 | HEIGHT: 71 IN | WEIGHT: 238 LBS

## 2022-01-07 DIAGNOSIS — R06.02 SHORTNESS OF BREATH: ICD-10-CM

## 2022-01-07 DIAGNOSIS — E78.5 HYPERLIPIDEMIA, UNSPECIFIED HYPERLIPIDEMIA TYPE: ICD-10-CM

## 2022-01-07 DIAGNOSIS — I51.89 GRADE I DIASTOLIC DYSFUNCTION: ICD-10-CM

## 2022-01-07 DIAGNOSIS — I10 PRIMARY HYPERTENSION: ICD-10-CM

## 2022-01-07 DIAGNOSIS — F17.200 SMOKING: ICD-10-CM

## 2022-01-07 DIAGNOSIS — I25.10 CORONARY ARTERY DISEASE INVOLVING NATIVE CORONARY ARTERY OF NATIVE HEART WITHOUT ANGINA PECTORIS: Primary | ICD-10-CM

## 2022-01-07 PROCEDURE — 99442 PR PHYS/QHP TELEPHONE EVALUATION 11-20 MIN: CPT | Performed by: NURSE PRACTITIONER

## 2022-01-07 RX ORDER — ATENOLOL 25 MG/1
25 TABLET ORAL DAILY
Qty: 90 TABLET | Refills: 3 | Status: SHIPPED | OUTPATIENT
Start: 2022-01-07 | End: 2023-01-17

## 2022-01-07 RX ORDER — ASPIRIN 81 MG/1
81 TABLET ORAL DAILY
Qty: 90 TABLET | Refills: 3 | Status: SHIPPED | OUTPATIENT
Start: 2022-01-07 | End: 2023-01-17

## 2022-01-07 RX ORDER — ATORVASTATIN CALCIUM 40 MG/1
40 TABLET, FILM COATED ORAL DAILY
Qty: 90 TABLET | Refills: 3 | Status: SHIPPED | OUTPATIENT
Start: 2022-01-07 | End: 2023-01-17

## 2022-01-07 RX ORDER — GABAPENTIN 300 MG/1
300 CAPSULE ORAL DAILY
COMMUNITY
Start: 2021-12-30

## 2022-01-07 RX ORDER — CLOPIDOGREL BISULFATE 75 MG/1
75 TABLET ORAL DAILY
Qty: 90 TABLET | Refills: 3 | Status: SHIPPED | OUTPATIENT
Start: 2022-01-07 | End: 2023-01-17

## 2022-01-07 RX ORDER — AMLODIPINE BESYLATE AND BENAZEPRIL HYDROCHLORIDE 5; 10 MG/1; MG/1
1 CAPSULE ORAL DAILY
Qty: 90 CAPSULE | Refills: 3 | Status: SHIPPED | OUTPATIENT
Start: 2022-01-07 | End: 2023-01-17

## 2022-01-07 RX ORDER — CHLORAL HYDRATE 500 MG
1000 CAPSULE ORAL DAILY
Qty: 90 CAPSULE | Refills: 3 | Status: SHIPPED | OUTPATIENT
Start: 2022-01-07 | End: 2023-01-17

## 2022-01-07 RX ORDER — DULAGLUTIDE 0.75 MG/.5ML
0.75 INJECTION, SOLUTION SUBCUTANEOUS WEEKLY
COMMUNITY
Start: 2021-12-17

## 2022-01-07 NOTE — PROGRESS NOTES
Subjective   Herber Stein is a 61 y.o. male     Chief Complaint   Patient presents with   • Coronary Artery Disease   • Hypertension     This visit has been rescheduled as a phone visit to comply with patient safety concerns in accordance with CDC recommendations. Total time of discussion was 18 minutes.    You have chosen to receive care through a telephone visit. Do you consent to use a telephone visit for your medical care today? Yes    HPI    Problem List:    1.) CAD status post stenting to RCA in 2012 with bare metal stent   a. Stress in 2014 showing posterolateral infarct with minimal martine-infarct ischemia.    b. Preserved Systolic fxn  2.) HTN  3.) Diabetes mellitus  4.) Shortness of breath  5.) Normal LV systolic function with EF 60%  5.1) Echo 11/10/14 - mild LVH; EF 50-55%; DD I; basal inferolateral wall segment is hypokinetic, trace AR, TR, MR and FL  6) Smoking Habituation, Cigars    Patient is a 61-year-old male who presents today via telephone visit as he was unable to get his [employer] video visit to work.  Patient denies any chest pain, pressure, palpitations, fluttering, presyncope, syncope, orthopnea, PND or edema.  Patient says he did have some dizziness when he had a chest cold and was put on antibiotics.  He says once he finished antibiotics the dizziness did resolve.  Patient says he has shortness of breath if he walks from our office to the front of our parking lot.  He says this has not changed but sometimes it will come on quicker than in the past.  He did have some increase in fatigue when he was sick.  We discussed the symptoms he had back when he previously had a stent placed and that was fatigue and shortness of breath.  He feels like this is not the same.  He says when he has right now is intermittent its not persistent.  He said previously it was persistent.  I again discussed with him he is not had a work-up in 8 years however he does not feel like one is warranted at this time.  I  did advise if he changes mind to let us know.  Patient has had recent blood work with PCP.    Current Outpatient Medications on File Prior to Visit   Medication Sig Dispense Refill   • cetirizine (ZyrTEC) 10 MG tablet Take 10 mg by mouth Daily.     • Dapagliflozin Propanediol (FARXIGA) 10 MG tablet Take 10 mg by mouth Daily.     • gabapentin (NEURONTIN) 300 MG capsule Take 300 mg by mouth 3 (Three) Times a Day.     • metFORMIN (GLUCOPHAGE) 500 MG tablet Take 500 mg by mouth 2 (Two) Times a Day. 2 tablets twice a day     • nitroglycerin (NITROSTAT) 0.4 MG SL tablet 1 tab under the tongue q 5 min, up to 3x, for CP. Go to ER if it doesn't work. 25 tablet 2   • Trulicity 0.75 MG/0.5ML solution pen-injector Inject 0.75 mg under the skin into the appropriate area as directed 1 (One) Time Per Week.     • [DISCONTINUED] amLODIPine-benazepril (Lotrel) 5-10 MG per capsule Take 1 capsule by mouth Daily. 90 capsule 3   • [DISCONTINUED] aspirin (aspirin) 81 MG EC tablet Take 1 tablet by mouth Daily. 90 tablet 3   • [DISCONTINUED] atenolol (TENORMIN) 25 MG tablet Take 1 tablet by mouth Daily. 90 tablet 3   • [DISCONTINUED] atorvastatin (LIPITOR) 40 MG tablet Take 1 tablet by mouth Daily. 90 tablet 3   • [DISCONTINUED] clopidogrel (PLAVIX) 75 MG tablet Take 1 tablet by mouth Daily. 90 tablet 3   • [DISCONTINUED] Omega-3 Fatty Acids (FISH OIL) 1000 MG capsule capsule Take 1 capsule by mouth Daily. 90 capsule 3     No current facility-administered medications on file prior to visit.       ALLERGIES    Patient has no known allergies.    Past Medical History:   Diagnosis Date   • Arthritis    • Atherosclerotic heart disease of native coronary artery without angina pectoris    • Chest pain 8/31/2016   • Diabetes mellitus (HCC) 8/31/2016   • Heart disease 8/31/2016   • HTN (hypertension) 8/31/2016   • Hyperlipidemia    • Migraine headache    • Neuropathy    • Obesity 8/31/2016   • Sleep apnea    • Umbilical hernia        Social History  "    Socioeconomic History   • Marital status: Single   Tobacco Use   • Smoking status: Current Some Day Smoker     Types: Cigars   • Smokeless tobacco: Never Used   Vaping Use   • Vaping Use: Never used   Substance and Sexual Activity   • Alcohol use: Yes     Comment: 6 pack per month   • Drug use: No   • Sexual activity: Defer       Family History   Problem Relation Age of Onset   • Stroke Mother    • Diabetes Mother    • Heart attack Mother    • Other Mother         CABG, Arotic Valve Replacement   • Hypertension Father    • Heart attack Brother        Review of Systems   Constitutional: Positive for fatigue (when he was sick; wore out ). Negative for diaphoresis.   HENT: Positive for congestion and rhinorrhea. Negative for ear pain and postnasal drip.    Eyes: Positive for visual disturbance (wears glasses ).   Respiratory: Positive for shortness of breath (walk from our office to the front of our office and will get short of breath, comes on quicker sometimes otherwise the same ). Negative for chest tightness and stridor.    Cardiovascular: Negative for chest pain, palpitations and leg swelling.   Gastrointestinal: Negative for constipation, diarrhea, nausea and vomiting.   Endocrine: Negative.    Genitourinary: Negative for difficulty urinating.   Musculoskeletal: Positive for arthralgias and back pain. Negative for neck pain.   Skin: Negative for wound.   Allergic/Immunologic: Positive for environmental allergies. Negative for food allergies.   Neurological: Positive for dizziness (had a chest cold; if he looked up quick; gone away with ABX ). Negative for syncope and light-headedness.   Hematological: Bruises/bleeds easily.   Psychiatric/Behavioral: Positive for sleep disturbance (lately ).       Objective   /85 (BP Location: Left arm, Patient Position: Sitting)   Ht 180.3 cm (71\")   Wt 108 kg (238 lb)   BMI 33.19 kg/m²   Vitals:    01/07/22 0805   BP: 135/85   BP Location: Left arm   Patient " "Position: Sitting   Weight: 108 kg (238 lb)   Height: 180.3 cm (71\")      Lab Results (most recent)     None        Physical Exam  Vitals reviewed.   Constitutional:       General: He is awake.      Appearance: He is obese.   Neurological:      Mental Status: He is alert and oriented to person, place, and time.   Psychiatric:         Attention and Perception: Attention and perception normal.         Mood and Affect: Mood and affect normal.         Speech: Speech normal.         Behavior: Behavior normal. Behavior is cooperative.         Thought Content: Thought content normal.         Cognition and Memory: Cognition and memory normal.         Judgment: Judgment normal.         Procedure   Procedures         Assessment/Plan      Diagnosis Plan   1. Coronary artery disease involving native coronary artery of native heart without angina pectoris  atorvastatin (LIPITOR) 40 MG tablet    clopidogrel (PLAVIX) 75 MG tablet    aspirin (aspirin) 81 MG EC tablet   2. Primary hypertension  amLODIPine-benazepril (Lotrel) 5-10 MG per capsule    atenolol (TENORMIN) 25 MG tablet   3. Hyperlipidemia, unspecified hyperlipidemia type  atorvastatin (LIPITOR) 40 MG tablet   4. Shortness of breath     5. Smoking     6. Grade I diastolic dysfunction         Return in about 6 months (around 7/7/2022).    CAD-patient's on Plavix, aspirin, beta and statin.  Hypertension-patient is on Lotrel and atenolol.  Per his report his blood pressure has been doing very well.  He does check it on occasion at home.  Hyperlipidemia-patient's on Lipitor and omega-3.  We are going to request labs from PCP.  Shortness of breath-stable.  Smoking-encouraged cessation.  Diastolic dysfunction-patient has no signs of failure.  He will continue his medication regimen.  He will follow-up in 6 months or sooner if any changes or should he decide he would like to have a repeat ischemia work-up.       Herber Sarita  reports that he has been smoking cigars. He has " never used smokeless tobacco.. I have educated him on the risk of diseases from using tobacco products such as cancer, COPD and heart disease.       Electronically signed by:

## 2022-07-12 ENCOUNTER — OFFICE VISIT (OUTPATIENT)
Dept: CARDIOLOGY | Facility: CLINIC | Age: 62
End: 2022-07-12

## 2022-07-12 VITALS
SYSTOLIC BLOOD PRESSURE: 130 MMHG | DIASTOLIC BLOOD PRESSURE: 79 MMHG | BODY MASS INDEX: 31.36 KG/M2 | HEIGHT: 71 IN | OXYGEN SATURATION: 97 % | WEIGHT: 224 LBS | TEMPERATURE: 97.1 F | HEART RATE: 74 BPM

## 2022-07-12 DIAGNOSIS — R06.02 SHORTNESS OF BREATH: ICD-10-CM

## 2022-07-12 DIAGNOSIS — I25.10 CORONARY ARTERY DISEASE INVOLVING NATIVE CORONARY ARTERY OF NATIVE HEART WITHOUT ANGINA PECTORIS: Primary | ICD-10-CM

## 2022-07-12 DIAGNOSIS — F17.200 SMOKING: ICD-10-CM

## 2022-07-12 DIAGNOSIS — I10 PRIMARY HYPERTENSION: ICD-10-CM

## 2022-07-12 DIAGNOSIS — E78.5 HYPERLIPIDEMIA, UNSPECIFIED HYPERLIPIDEMIA TYPE: ICD-10-CM

## 2022-07-12 DIAGNOSIS — I51.89 GRADE I DIASTOLIC DYSFUNCTION: ICD-10-CM

## 2022-07-12 PROCEDURE — 93000 ELECTROCARDIOGRAM COMPLETE: CPT | Performed by: NURSE PRACTITIONER

## 2022-07-12 PROCEDURE — 99214 OFFICE O/P EST MOD 30 MIN: CPT | Performed by: NURSE PRACTITIONER

## 2022-07-12 RX ORDER — BUSPIRONE HYDROCHLORIDE 15 MG/1
15 TABLET ORAL 2 TIMES DAILY
COMMUNITY

## 2022-07-12 NOTE — PROGRESS NOTES
Subjective   Herber Stein is a 61 y.o. male     Chief Complaint   Patient presents with   • Follow-up       HPI    Problem List:    1.) CAD status post stenting to RCA in 2012 with bare metal stent   a. Stress in 2014 showing posterolateral infarct with minimal martine-infarct ischemia.    b. Preserved Systolic fxn  2.) HTN  3.) Diabetes mellitus  4.) Shortness of breath  5.) Normal LV systolic function with EF 60%  5.1) Echo 11/10/14 - mild LVH; EF 50-55%; DD I; basal inferolateral wall segment is hypokinetic, trace AR, TR, MR and FL  6) Smoking Habituation, Cigars    Patient is a 61-year-old male who presents today for a follow-up.  He denies any chest pain, pressure, palpitations, fluttering, dizziness, presyncope, syncope, orthopnea, PND or edema.  Patient says he does have some weakness in his right arm at times but he thinks it comes from his possible shoulder or neck.  He says sometimes he will lose  in it.  Patient says that he also have a lot of back issues and was seeing a pain management doctor but did not want to be on medications.  He says he does have shortness of breath if he walks long distance.  Patient said when he had his previous stent placed he had a lot of fatigue and then eventually he did have some chest pain.  He does not feel like he has any of the symptoms currently.  I did discuss repeating an ischemia work-up as it has been sometime since he has had 1 however he feels like he is doing very well.  He does have some stress because his 1 daughter has an issue with her liver and the other daughter they found a spot on her lung after COVID.    We went over labs.  A1c was 7.5, triglycerides 175, LDL 82, K4.4 and creatinine was 1.01.    Current Outpatient Medications on File Prior to Visit   Medication Sig Dispense Refill   • amLODIPine-benazepril (Lotrel) 5-10 MG per capsule Take 1 capsule by mouth Daily. 90 capsule 3   • aspirin (aspirin) 81 MG EC tablet Take 1 tablet by mouth Daily. 90  tablet 3   • atenolol (TENORMIN) 25 MG tablet Take 1 tablet by mouth Daily. 90 tablet 3   • atorvastatin (LIPITOR) 40 MG tablet Take 1 tablet by mouth Daily. 90 tablet 3   • busPIRone (BUSPAR) 15 MG tablet Take 15 mg by mouth 2 (Two) Times a Day.     • cetirizine (ZyrTEC) 10 MG tablet Take 10 mg by mouth Daily.     • clopidogrel (PLAVIX) 75 MG tablet Take 1 tablet by mouth Daily. 90 tablet 3   • dapagliflozin Propanediol 10 MG tablet Take 10 mg by mouth Daily.     • gabapentin (NEURONTIN) 400 MG capsule Take 300 mg by mouth Daily.     • metFORMIN (GLUCOPHAGE) 500 MG tablet Take 500 mg by mouth 2 (Two) Times a Day. 2 tablets twice a day     • nitroglycerin (NITROSTAT) 0.4 MG SL tablet 1 tab under the tongue q 5 min, up to 3x, for CP. Go to ER if it doesn't work. 25 tablet 2   • Omega-3 Fatty Acids (fish oil) 1000 MG capsule capsule Take 1 capsule by mouth Daily. 90 capsule 3   • Trulicity 0.75 MG/0.5ML solution pen-injector Inject 0.75 mg under the skin into the appropriate area as directed 1 (One) Time Per Week.       No current facility-administered medications on file prior to visit.       ALLERGIES    Patient has no known allergies.    Past Medical History:   Diagnosis Date   • Arthritis    • Atherosclerotic heart disease of native coronary artery without angina pectoris    • Chest pain 8/31/2016   • Diabetes mellitus (HCC) 8/31/2016   • Heart disease 8/31/2016   • HTN (hypertension) 8/31/2016   • Hyperlipidemia    • Migraine headache    • Neuropathy    • Obesity 8/31/2016   • Sleep apnea    • Umbilical hernia        Social History     Socioeconomic History   • Marital status: Single   Tobacco Use   • Smoking status: Current Some Day Smoker     Types: Cigars   • Smokeless tobacco: Never Used   Vaping Use   • Vaping Use: Never used   Substance and Sexual Activity   • Alcohol use: Yes     Comment: 6 pack per month   • Drug use: No   • Sexual activity: Defer       Family History   Problem Relation Age of Onset   •  "Stroke Mother    • Diabetes Mother    • Heart attack Mother    • Other Mother         CABG, Arotic Valve Replacement   • Hypertension Father    • Heart attack Brother        Review of Systems   Constitutional: Negative for appetite change, chills, fatigue and fever.   HENT: Negative for congestion, rhinorrhea and sore throat.    Eyes: Positive for visual disturbance (glasses).   Respiratory: Positive for shortness of breath (walking long distance ). Negative for cough, chest tightness and wheezing.    Cardiovascular: Negative for chest pain, palpitations and leg swelling.   Gastrointestinal: Negative for abdominal pain, blood in stool, constipation, diarrhea, nausea and vomiting.   Endocrine: Negative for cold intolerance and heat intolerance.   Genitourinary: Positive for frequency (several times a day and night due to water pills plus he drinks water and milk, Dt Coke ). Negative for difficulty urinating, dysuria, hematuria and urgency.   Musculoskeletal: Positive for arthralgias (all over the body ), back pain (lower back ), gait problem (uses a cane ) and joint swelling (hands ). Negative for neck pain and neck stiffness.   Skin: Negative for color change, pallor, rash and wound.   Allergic/Immunologic: Negative for environmental allergies and food allergies.   Neurological: Positive for weakness (legs, he will use the cane when he is out at times he has weak left ankle ) and numbness (feet ( numbness and tingles ) ). Negative for dizziness, light-headedness and headaches.   Hematological: Bruises/bleeds easily (Brusies easy ).   Psychiatric/Behavioral: Negative for sleep disturbance.       Objective   /79 (BP Location: Left arm, Patient Position: Sitting)   Pulse 74   Temp 97.1 °F (36.2 °C)   Ht 180.3 cm (71\")   Wt 102 kg (224 lb)   SpO2 97%   BMI 31.24 kg/m²   Vitals:    07/12/22 1343   BP: 130/79   BP Location: Left arm   Patient Position: Sitting   Pulse: 74   Temp: 97.1 °F (36.2 °C)   SpO2: 97% " "  Weight: 102 kg (224 lb)   Height: 180.3 cm (71\")      Lab Results (most recent)     None        Physical Exam  Vitals reviewed.   Constitutional:       General: He is awake.      Appearance: Normal appearance. He is well-developed and well-groomed. He is obese.   HENT:      Head: Normocephalic.   Eyes:      General: Lids are normal.      Comments: Wears glasses    Neck:      Vascular: No carotid bruit, hepatojugular reflux or JVD.   Cardiovascular:      Rate and Rhythm: Normal rate and regular rhythm.      Pulses:           Radial pulses are 2+ on the right side and 2+ on the left side.        Dorsalis pedis pulses are 2+ on the right side and 2+ on the left side.        Posterior tibial pulses are 2+ on the right side and 2+ on the left side.      Heart sounds: Normal heart sounds.   Pulmonary:      Effort: Pulmonary effort is normal.      Breath sounds: Normal breath sounds and air entry.   Abdominal:      General: Bowel sounds are normal.      Palpations: Abdomen is soft.   Musculoskeletal:      Right lower leg: No edema.      Left lower leg: No edema.      Comments: Uses a cane   Skin:     General: Skin is warm and dry.   Neurological:      Mental Status: He is alert and oriented to person, place, and time.   Psychiatric:         Attention and Perception: Attention and perception normal.         Mood and Affect: Mood and affect normal.         Speech: Speech normal.         Behavior: Behavior normal. Behavior is cooperative.         Thought Content: Thought content normal.         Cognition and Memory: Cognition and memory normal.         Judgment: Judgment normal.         Procedure     ECG 12 Lead    Date/Time: 7/12/2022 1:57 PM  Performed by: Patricia Newman APRN  Authorized by: Patricia Newman APRN   Comparison: compared with previous ECG from 3/3/2021  Similar to previous ECG  Rhythm: sinus rhythm  Rate: normal  BPM: 76  Q waves: V1 and V2    QRS axis: normal    Clinical impression: abnormal " EKG                 Assessment & Plan      Diagnosis Plan   1. Coronary artery disease involving native coronary artery of native heart without angina pectoris  ECG 12 Lead   2. Grade I diastolic dysfunction     3. Primary hypertension  ECG 12 Lead   4. Hyperlipidemia, unspecified hyperlipidemia type     5. Shortness of breath     6. Smoking         Return in about 6 months (around 1/12/2023).    CAD-patient's on aspirin, beta, Plavix and statin.  Diastolic dysfunction-patient has no signs of failure.  Hypertension-patient's on Lo-Trol and atenolol.  Hyperlipidemia-patient is on Lipitor and last LDL was 82.  Shortness of breath-stable.  Smoking-encourage cessation.  He will continue his medication regimen.  He will follow-up in 6 months or sooner if any changes.       Herber Sarita  reports that he has been smoking cigars. He has never used smokeless tobacco.. I have educated him on the risk of diseases from using tobacco products such as cancer, COPD and heart disease.     I advised him to quit and he is not willing to quit.    I spent 3  minutes counseling the patient. Patient brought in medicine list to appointment, it's been reviewed with patient and med list was updated in the chart.                Electronically signed by:

## 2023-01-17 ENCOUNTER — OFFICE VISIT (OUTPATIENT)
Dept: CARDIOLOGY | Facility: CLINIC | Age: 63
End: 2023-01-17
Payer: COMMERCIAL

## 2023-01-17 VITALS
BODY MASS INDEX: 31.64 KG/M2 | DIASTOLIC BLOOD PRESSURE: 85 MMHG | TEMPERATURE: 97.2 F | OXYGEN SATURATION: 94 % | SYSTOLIC BLOOD PRESSURE: 142 MMHG | WEIGHT: 226 LBS | HEART RATE: 75 BPM | HEIGHT: 71 IN

## 2023-01-17 DIAGNOSIS — R06.02 SHORTNESS OF BREATH: ICD-10-CM

## 2023-01-17 DIAGNOSIS — E11.9 TYPE 2 DIABETES MELLITUS WITHOUT COMPLICATION, WITHOUT LONG-TERM CURRENT USE OF INSULIN: ICD-10-CM

## 2023-01-17 DIAGNOSIS — I25.10 CORONARY ARTERY DISEASE INVOLVING NATIVE CORONARY ARTERY OF NATIVE HEART WITHOUT ANGINA PECTORIS: Primary | ICD-10-CM

## 2023-01-17 DIAGNOSIS — I10 PRIMARY HYPERTENSION: ICD-10-CM

## 2023-01-17 DIAGNOSIS — E78.5 HYPERLIPIDEMIA, UNSPECIFIED HYPERLIPIDEMIA TYPE: ICD-10-CM

## 2023-01-17 DIAGNOSIS — I25.10 CORONARY ARTERY DISEASE INVOLVING NATIVE CORONARY ARTERY OF NATIVE HEART WITHOUT ANGINA PECTORIS: ICD-10-CM

## 2023-01-17 DIAGNOSIS — I51.89 GRADE I DIASTOLIC DYSFUNCTION: ICD-10-CM

## 2023-01-17 DIAGNOSIS — F17.200 SMOKING: ICD-10-CM

## 2023-01-17 PROCEDURE — 99214 OFFICE O/P EST MOD 30 MIN: CPT | Performed by: NURSE PRACTITIONER

## 2023-01-17 RX ORDER — AMLODIPINE BESYLATE AND BENAZEPRIL HYDROCHLORIDE 5; 10 MG/1; MG/1
CAPSULE ORAL
Qty: 90 CAPSULE | Refills: 3 | Status: SHIPPED | OUTPATIENT
Start: 2023-01-17

## 2023-01-17 RX ORDER — CHLORAL HYDRATE 500 MG
1000 CAPSULE ORAL DAILY
Qty: 90 CAPSULE | Refills: 3 | Status: SHIPPED | OUTPATIENT
Start: 2023-01-17

## 2023-01-17 RX ORDER — CLOPIDOGREL BISULFATE 75 MG/1
75 TABLET ORAL DAILY
Qty: 90 TABLET | Refills: 3 | Status: SHIPPED | OUTPATIENT
Start: 2023-01-17

## 2023-01-17 RX ORDER — ATORVASTATIN CALCIUM 40 MG/1
TABLET, FILM COATED ORAL
Qty: 90 TABLET | Refills: 3 | Status: SHIPPED | OUTPATIENT
Start: 2023-01-17

## 2023-01-17 RX ORDER — ASPIRIN 81 MG/1
TABLET, COATED ORAL
Qty: 90 TABLET | Refills: 3 | Status: SHIPPED | OUTPATIENT
Start: 2023-01-17

## 2023-01-17 RX ORDER — METFORMIN HYDROCHLORIDE 500 MG/1
500 TABLET, EXTENDED RELEASE ORAL 2 TIMES DAILY
COMMUNITY

## 2023-01-17 RX ORDER — ATENOLOL 25 MG/1
TABLET ORAL
Qty: 90 TABLET | Refills: 3 | Status: SHIPPED | OUTPATIENT
Start: 2023-01-17

## 2023-01-17 RX ORDER — NITROGLYCERIN 0.4 MG/1
TABLET SUBLINGUAL
Qty: 45 TABLET | Refills: 2 | Status: SHIPPED | OUTPATIENT
Start: 2023-01-17 | End: 2023-03-15

## 2023-01-17 NOTE — PROGRESS NOTES
Subjective   Herber Stein is a 62 y.o. male     Chief Complaint   Patient presents with   • Follow-up       HPI    Problem List:    1.) CAD status post stenting to RCA in 2012 with bare metal stent   a. Stress in 2014 showing posterolateral infarct with minimal martine-infarct ischemia.    b. Preserved Systolic fxn  2.) HTN  3.) Diabetes mellitus since 35 y/o   4.) Shortness of breath  5.) Normal LV systolic function with EF 60%  5.1) Echo 11/10/14 - mild LVH; EF 50-55%; DD I; basal inferolateral wall segment is hypokinetic, trace AR, TR, MR and VA  6) Smoking Habituation, Cigars    Patient is a 62-year-old male who presents today for a follow-up.  He denies any chest pain, pressure, palpitations, fluttering, dizziness, presyncope, syncope, orthopnea, PND or edema.  He does not have shortness of breath if he tries to walk faster than normal.  Patient says that when he had his previous stent he had fatigue and shortness of breath only.  He does have diabetes and has not had a stress since 2014.      Patient will be getting labs at his next visit with his PCP however last LDL was 82, triglycerides 175 and A1c was 7.5.    Current Outpatient Medications on File Prior to Visit   Medication Sig Dispense Refill   • busPIRone (BUSPAR) 15 MG tablet Take 15 mg by mouth 2 (Two) Times a Day.     • cetirizine (ZyrTEC) 10 MG tablet Take 10 mg by mouth Daily.     • dapagliflozin Propanediol 10 MG tablet Take 10 mg by mouth Daily.     • gabapentin (NEURONTIN) 300 MG capsule Take 300 mg by mouth Daily.     • metFORMIN ER (GLUCOPHAGE-XR) 500 MG 24 hr tablet Take 500 mg by mouth 2 (Two) Times a Day.     • Trulicity 0.75 MG/0.5ML solution pen-injector Inject 0.75 mg under the skin into the appropriate area as directed 1 (One) Time Per Week.     • [DISCONTINUED] metFORMIN (GLUCOPHAGE) 500 MG tablet Take 500 mg by mouth 2 (Two) Times a Day. 2 tablets twice a day     • [DISCONTINUED] nitroglycerin (NITROSTAT) 0.4 MG SL tablet 1 tab under  the tongue q 5 min, up to 3x, for CP. Go to ER if it doesn't work. 25 tablet 2   • [DISCONTINUED] amLODIPine-benazepril (Lotrel) 5-10 MG per capsule Take 1 capsule by mouth Daily. 90 capsule 3   • [DISCONTINUED] aspirin (aspirin) 81 MG EC tablet Take 1 tablet by mouth Daily. 90 tablet 3   • [DISCONTINUED] atenolol (TENORMIN) 25 MG tablet Take 1 tablet by mouth Daily. 90 tablet 3   • [DISCONTINUED] atorvastatin (LIPITOR) 40 MG tablet Take 1 tablet by mouth Daily. 90 tablet 3   • [DISCONTINUED] clopidogrel (PLAVIX) 75 MG tablet Take 1 tablet by mouth Daily. 90 tablet 3   • [DISCONTINUED] Omega-3 Fatty Acids (fish oil) 1000 MG capsule capsule Take 1 capsule by mouth Daily. 90 capsule 3     No current facility-administered medications on file prior to visit.       ALLERGIES    Patient has no known allergies.    Past Medical History:   Diagnosis Date   • Arthritis    • Atherosclerotic heart disease of native coronary artery without angina pectoris    • Chest pain 8/31/2016   • Diabetes mellitus (HCC) 8/31/2016   • Heart disease 8/31/2016   • HTN (hypertension) 8/31/2016   • Hyperlipidemia    • Migraine headache    • Neuropathy    • Obesity 8/31/2016   • Sleep apnea    • Umbilical hernia        Social History     Socioeconomic History   • Marital status: Single   Tobacco Use   • Smoking status: Some Days     Types: Cigars   • Smokeless tobacco: Never   Vaping Use   • Vaping Use: Never used   Substance and Sexual Activity   • Alcohol use: Yes     Comment: 6 pack per month   • Drug use: No   • Sexual activity: Defer       Family History   Problem Relation Age of Onset   • Stroke Mother    • Diabetes Mother    • Heart attack Mother    • Other Mother         CABG, Arotic Valve Replacement   • Hypertension Father    • Heart attack Brother        Review of Systems   Constitutional: Negative for appetite change, chills, diaphoresis, fatigue and fever.   HENT: Negative for congestion, sinus pain and sore throat.    Eyes: Positive  "for visual disturbance (glasses).   Respiratory: Positive for shortness of breath (if he tries to speed up his walking he will get short of breath ). Negative for cough, chest tightness and wheezing.    Cardiovascular: Negative for chest pain, palpitations and leg swelling.   Gastrointestinal: Negative for abdominal pain, blood in stool, constipation, diarrhea, nausea and vomiting.   Endocrine: Negative for cold intolerance and heat intolerance.   Genitourinary: Positive for frequency (severa times a day and night ). Negative for difficulty urinating, dysuria, hematuria and urgency.   Musculoskeletal: Positive for arthralgias (throguhout the body ), back pain (middle and lower back  ), gait problem (uses a caNe; unable to walk 2 blocks ), joint swelling (hands ) and neck pain (right side of the neck and runs down his shoulder ). Negative for neck stiffness.        Had part of ankle removed and gives away; very unsteady on any incline    Skin: Negative for color change, pallor, rash and wound.   Allergic/Immunologic: Positive for environmental allergies (seasonal). Negative for food allergies.   Neurological: Positive for weakness (right shoulder weakness). Negative for dizziness, syncope, light-headedness, numbness and headaches.   Hematological: Bruises/bleeds easily (Bruises easy ).   Psychiatric/Behavioral: Negative for sleep disturbance.       Objective   /85 (BP Location: Left arm, Patient Position: Sitting)   Pulse 75   Temp 97.2 °F (36.2 °C)   Ht 180.3 cm (71\")   Wt 103 kg (226 lb)   SpO2 94%   BMI 31.52 kg/m²   Vitals:    01/17/23 1322   BP: 142/85   BP Location: Left arm   Patient Position: Sitting   Pulse: 75   Temp: 97.2 °F (36.2 °C)   SpO2: 94%   Weight: 103 kg (226 lb)   Height: 180.3 cm (71\")      Lab Results (most recent)     None        Physical Exam  Vitals reviewed.   Constitutional:       General: He is awake.      Appearance: Normal appearance. He is well-developed and well-groomed. " He is obese.   HENT:      Head: Normocephalic.   Eyes:      General: Lids are normal.      Comments: Wears glasses   Neck:      Vascular: No carotid bruit, hepatojugular reflux or JVD.   Cardiovascular:      Rate and Rhythm: Normal rate and regular rhythm.      Pulses:           Radial pulses are 2+ on the right side and 2+ on the left side.        Dorsalis pedis pulses are 2+ on the right side and 2+ on the left side.        Posterior tibial pulses are 2+ on the right side and 2+ on the left side.      Heart sounds: Normal heart sounds.   Pulmonary:      Effort: Pulmonary effort is normal.      Breath sounds: Normal breath sounds and air entry.   Abdominal:      General: Bowel sounds are normal.      Palpations: Abdomen is soft.   Musculoskeletal:      Right lower leg: No edema.      Left lower leg: No edema.   Skin:     General: Skin is warm and dry.   Neurological:      Mental Status: He is alert and oriented to person, place, and time.   Psychiatric:         Attention and Perception: Attention and perception normal.         Mood and Affect: Mood and affect normal.         Speech: Speech normal.         Behavior: Behavior normal. Behavior is cooperative.         Thought Content: Thought content normal.         Cognition and Memory: Cognition and memory normal.         Judgment: Judgment normal.         Procedure   Procedures         Assessment & Plan      Diagnosis Plan   1. Coronary artery disease involving native coronary artery of native heart without angina pectoris  Stress Test With Myocardial Perfusion One Day      2. Primary hypertension  Stress Test With Myocardial Perfusion One Day      3. Hyperlipidemia, unspecified hyperlipidemia type  Stress Test With Myocardial Perfusion One Day      4. Grade I diastolic dysfunction        5. Shortness of breath  Stress Test With Myocardial Perfusion One Day      6. Smoking  Stress Test With Myocardial Perfusion One Day      7. Type 2 diabetes mellitus without  complication, without long-term current use of insulin ([facility])  Stress Test With Myocardial Perfusion One Day          Return in about 12 weeks (around 4/11/2023).    CAD/hypertension/hyperlipidemia/shortness of breath/smoking/diabetes-patient will have a Lexiscan stress test.  Patient is unable to walk for 2 blocks because he has to use a cane because of his back and ankle.  He cannot walk with any incline without using his cane.  Diastolic dysfunction-patient has no signs of failure.  CAD-patient's on aspirin, beta and statin.  Hypertension-patient's on Lo-Trol and beta-blocker.  Hyperlipidemia-patient's on Lipitor.  Last LDL was 82 but we have not received labs from this year yet.  Patient will continue his medication regimen.  He will follow-up in 12 weeks or sooner if any changes or abnormalities with testing.  Due to patient having phone issues we scheduled his stress test prior to him leaving and is February 1 at 930.         Herber Stein  reports that he has been smoking cigars. He has never used smokeless tobacco.. I have educated him on the risk of diseases from using tobacco products such as cancer, COPD and heart disease.     I advised him to quit and he is not willing to quit.    I spent 3  minutes counseling the patient.         Patient did not bring med list or medicine bottles to appointment, med list has been reviewed and updated based on patient's knowledge of their meds.       Electronically signed by:

## 2023-01-17 NOTE — LETTER
January 17, 2023     REAL Estevez  [address]  [facility] [address]    Patient: Herber Stein   YOB: 1960   Date of Visit: 1/17/2023       Dear REAL Amanda:    Thank you for referring Herber Stein to me for evaluation. Below are the relevant portions of my assessment and plan of care.    If you have questions, please do not hesitate to call me. I look forward to following Herber along with you.         Sincerely,        REAL Bernard        CC: No Recipients  Patricia Miller APRN  01/17/23 1347  Signed  Subjective    Herber Stein is a 62 y.o. male     Chief Complaint   Patient presents with   • Follow-up       HPI    Problem List:    1.) CAD status post stenting to RCA in 2012 with bare metal stent   a. Stress in 2014 showing posterolateral infarct with minimal martine-infarct ischemia.    b. Preserved Systolic fxn  2.) HTN  3.) Diabetes mellitus since 35 y/o   4.) Shortness of breath  5.) Normal LV systolic function with EF 60%  5.1) Echo 11/10/14 - mild LVH; EF 50-55%; DD I; basal inferolateral wall segment is hypokinetic, trace AR, TR, MR and NJ  6) Smoking Habituation, Cigars    Patient is a 62-year-old male who presents today for a follow-up.  He denies any chest pain, pressure, palpitations, fluttering, dizziness, presyncope, syncope, orthopnea, PND or edema.  He does not have shortness of breath if he tries to walk faster than normal.  Patient says that when he had his previous stent he had fatigue and shortness of breath only.  He does have diabetes and has not had a stress since 2014.      Patient will be getting labs at his next visit with his PCP however last LDL was 82, triglycerides 175 and A1c was 7.5.    Current Outpatient Medications on File Prior to Visit   Medication Sig Dispense Refill   • busPIRone (BUSPAR) 15 MG tablet Take 15 mg by mouth 2 (Two) Times a Day.     • cetirizine (ZyrTEC) 10 MG tablet Take 10 mg by mouth Daily.     • dapagliflozin  Propanediol 10 MG tablet Take 10 mg by mouth Daily.     • gabapentin (NEURONTIN) 300 MG capsule Take 300 mg by mouth Daily.     • metFORMIN ER (GLUCOPHAGE-XR) 500 MG 24 hr tablet Take 500 mg by mouth 2 (Two) Times a Day.     • Trulicity 0.75 MG/0.5ML solution pen-injector Inject 0.75 mg under the skin into the appropriate area as directed 1 (One) Time Per Week.     • [DISCONTINUED] metFORMIN (GLUCOPHAGE) 500 MG tablet Take 500 mg by mouth 2 (Two) Times a Day. 2 tablets twice a day     • [DISCONTINUED] nitroglycerin (NITROSTAT) 0.4 MG SL tablet 1 tab under the tongue q 5 min, up to 3x, for CP. Go to ER if it doesn't work. 25 tablet 2   • [DISCONTINUED] amLODIPine-benazepril (Lotrel) 5-10 MG per capsule Take 1 capsule by mouth Daily. 90 capsule 3   • [DISCONTINUED] aspirin (aspirin) 81 MG EC tablet Take 1 tablet by mouth Daily. 90 tablet 3   • [DISCONTINUED] atenolol (TENORMIN) 25 MG tablet Take 1 tablet by mouth Daily. 90 tablet 3   • [DISCONTINUED] atorvastatin (LIPITOR) 40 MG tablet Take 1 tablet by mouth Daily. 90 tablet 3   • [DISCONTINUED] clopidogrel (PLAVIX) 75 MG tablet Take 1 tablet by mouth Daily. 90 tablet 3   • [DISCONTINUED] Omega-3 Fatty Acids (fish oil) 1000 MG capsule capsule Take 1 capsule by mouth Daily. 90 capsule 3     No current facility-administered medications on file prior to visit.       ALLERGIES    Patient has no known allergies.    Past Medical History:   Diagnosis Date   • Arthritis    • Atherosclerotic heart disease of native coronary artery without angina pectoris    • Chest pain 8/31/2016   • Diabetes mellitus (HCC) 8/31/2016   • Heart disease 8/31/2016   • HTN (hypertension) 8/31/2016   • Hyperlipidemia    • Migraine headache    • Neuropathy    • Obesity 8/31/2016   • Sleep apnea    • Umbilical hernia        Social History     Socioeconomic History   • Marital status: Single   Tobacco Use   • Smoking status: Some Days     Types: Cigars   • Smokeless tobacco: Never   Vaping Use   •  Vaping Use: Never used   Substance and Sexual Activity   • Alcohol use: Yes     Comment: 6 pack per month   • Drug use: No   • Sexual activity: Defer       Family History   Problem Relation Age of Onset   • Stroke Mother    • Diabetes Mother    • Heart attack Mother    • Other Mother         CABG, Arotic Valve Replacement   • Hypertension Father    • Heart attack Brother        Review of Systems   Constitutional: Negative for appetite change, chills, diaphoresis, fatigue and fever.   HENT: Negative for congestion, sinus pain and sore throat.    Eyes: Positive for visual disturbance (glasses).   Respiratory: Positive for shortness of breath (if he tries to speed up his walking he will get short of breath ). Negative for cough, chest tightness and wheezing.    Cardiovascular: Negative for chest pain, palpitations and leg swelling.   Gastrointestinal: Negative for abdominal pain, blood in stool, constipation, diarrhea, nausea and vomiting.   Endocrine: Negative for cold intolerance and heat intolerance.   Genitourinary: Positive for frequency (severa times a day and night ). Negative for difficulty urinating, dysuria, hematuria and urgency.   Musculoskeletal: Positive for arthralgias (throguhout the body ), back pain (middle and lower back  ), gait problem (uses a caNe; unable to walk 2 blocks ), joint swelling (hands ) and neck pain (right side of the neck and runs down his shoulder ). Negative for neck stiffness.        Had part of ankle removed and gives away; very unsteady on any incline    Skin: Negative for color change, pallor, rash and wound.   Allergic/Immunologic: Positive for environmental allergies (seasonal). Negative for food allergies.   Neurological: Positive for weakness (right shoulder weakness). Negative for dizziness, syncope, light-headedness, numbness and headaches.   Hematological: Bruises/bleeds easily (Bruises easy ).   Psychiatric/Behavioral: Negative for sleep disturbance.       Objective   "  /85 (BP Location: Left arm, Patient Position: Sitting)   Pulse 75   Temp 97.2 °F (36.2 °C)   Ht 180.3 cm (71\")   Wt 103 kg (226 lb)   SpO2 94%   BMI 31.52 kg/m²   Vitals:    01/17/23 1322   BP: 142/85   BP Location: Left arm   Patient Position: Sitting   Pulse: 75   Temp: 97.2 °F (36.2 °C)   SpO2: 94%   Weight: 103 kg (226 lb)   Height: 180.3 cm (71\")      Lab Results (most recent)     None        Physical Exam  Vitals reviewed.   Constitutional:       General: He is awake.      Appearance: Normal appearance. He is well-developed and well-groomed. He is obese.   HENT:      Head: Normocephalic.   Eyes:      General: Lids are normal.      Comments: Wears glasses   Neck:      Vascular: No carotid bruit, hepatojugular reflux or JVD.   Cardiovascular:      Rate and Rhythm: Normal rate and regular rhythm.      Pulses:           Radial pulses are 2+ on the right side and 2+ on the left side.        Dorsalis pedis pulses are 2+ on the right side and 2+ on the left side.        Posterior tibial pulses are 2+ on the right side and 2+ on the left side.      Heart sounds: Normal heart sounds.   Pulmonary:      Effort: Pulmonary effort is normal.      Breath sounds: Normal breath sounds and air entry.   Abdominal:      General: Bowel sounds are normal.      Palpations: Abdomen is soft.   Musculoskeletal:      Right lower leg: No edema.      Left lower leg: No edema.   Skin:     General: Skin is warm and dry.   Neurological:      Mental Status: He is alert and oriented to person, place, and time.   Psychiatric:         Attention and Perception: Attention and perception normal.         Mood and Affect: Mood and affect normal.         Speech: Speech normal.         Behavior: Behavior normal. Behavior is cooperative.         Thought Content: Thought content normal.         Cognition and Memory: Cognition and memory normal.         Judgment: Judgment normal.         Procedure    Procedures        Assessment & Plan      " Diagnosis Plan   1. Coronary artery disease involving native coronary artery of native heart without angina pectoris  Stress Test With Myocardial Perfusion One Day      2. Primary hypertension  Stress Test With Myocardial Perfusion One Day      3. Hyperlipidemia, unspecified hyperlipidemia type  Stress Test With Myocardial Perfusion One Day      4. Grade I diastolic dysfunction        5. Shortness of breath  Stress Test With Myocardial Perfusion One Day      6. Smoking  Stress Test With Myocardial Perfusion One Day      7. Type 2 diabetes mellitus without complication, without long-term current use of insulin ([facility])  Stress Test With Myocardial Perfusion One Day          Return in about 12 weeks (around 4/11/2023).    CAD/hypertension/hyperlipidemia/shortness of breath/smoking/diabetes-patient will have a Lexiscan stress test.  Patient is unable to walk for 2 blocks because he has to use a cane because of his back and ankle.  He cannot walk with any incline without using his cane.  Diastolic dysfunction-patient has no signs of failure.  CAD-patient's on aspirin, beta and statin.  Hypertension-patient's on Lo-Trol and beta-blocker.  Hyperlipidemia-patient's on Lipitor.  Last LDL was 82 but we have not received labs from this year yet.  Patient will continue his medication regimen.  He will follow-up in 12 weeks or sooner if any changes or abnormalities with testing.  Due to patient having phone issues we scheduled his stress test prior to him leaving and is February 1 at 930.        Herber Stein  reports that he has been smoking cigars. He has never used smokeless tobacco.. I have educated him on the risk of diseases from using tobacco products such as cancer, COPD and heart disease.     I advised him to quit and he is not willing to quit.    I spent 3  minutes counseling the patient.         Patient did not bring med list or medicine bottles to appointment, med list has been reviewed and updated based on  patient's knowledge of their meds.       Electronically signed by:

## 2023-02-01 ENCOUNTER — HOSPITAL ENCOUNTER (OUTPATIENT)
Dept: CARDIOLOGY | Facility: HOSPITAL | Age: 63
Discharge: HOME | End: 2023-02-01
Payer: COMMERCIAL

## 2023-02-01 DIAGNOSIS — E11.9 TYPE 2 DIABETES MELLITUS WITHOUT COMPLICATION, WITHOUT LONG-TERM CURRENT USE OF INSULIN: ICD-10-CM

## 2023-02-01 DIAGNOSIS — R06.02 SHORTNESS OF BREATH: ICD-10-CM

## 2023-02-01 DIAGNOSIS — F17.200 SMOKING: ICD-10-CM

## 2023-02-01 DIAGNOSIS — I25.10 CORONARY ARTERY DISEASE INVOLVING NATIVE CORONARY ARTERY OF NATIVE HEART WITHOUT ANGINA PECTORIS: ICD-10-CM

## 2023-02-01 DIAGNOSIS — I10 PRIMARY HYPERTENSION: ICD-10-CM

## 2023-02-01 DIAGNOSIS — E78.5 HYPERLIPIDEMIA, UNSPECIFIED HYPERLIPIDEMIA TYPE: ICD-10-CM

## 2023-02-01 PROCEDURE — 78452 HT MUSCLE IMAGE SPECT MULT: CPT

## 2023-02-01 PROCEDURE — A9500 TC99M SESTAMIBI: HCPCS | Performed by: INTERNAL MEDICINE

## 2023-02-01 PROCEDURE — 25010000002 REGADENOSON 0.4 MG/5ML SOLUTION: Performed by: INTERNAL MEDICINE

## 2023-02-01 PROCEDURE — 0 TECHNETIUM SESTAMIBI: Performed by: INTERNAL MEDICINE

## 2023-02-01 PROCEDURE — 93018 CV STRESS TEST I&R ONLY: CPT | Performed by: INTERNAL MEDICINE

## 2023-02-01 PROCEDURE — 78452 HT MUSCLE IMAGE SPECT MULT: CPT | Performed by: INTERNAL MEDICINE

## 2023-02-01 PROCEDURE — 93017 CV STRESS TEST TRACING ONLY: CPT

## 2023-02-01 RX ADMIN — REGADENOSON 0.4 MG: 0.08 INJECTION, SOLUTION INTRAVENOUS at 11:20

## 2023-02-01 RX ADMIN — TECHNETIUM TC 99M SESTAMIBI 1 DOSE: 1 INJECTION INTRAVENOUS at 09:32

## 2023-02-01 RX ADMIN — TECHNETIUM TC 99M SESTAMIBI 1 DOSE: 1 INJECTION INTRAVENOUS at 11:21

## 2023-02-02 LAB
BH CV REST NUCLEAR ISOTOPE DOSE: 10 MCI
BH CV STRESS COMMENTS STAGE 1: NORMAL
BH CV STRESS DOSE REGADENOSON STAGE 1: 0.4
BH CV STRESS DURATION MIN STAGE 1: 0
BH CV STRESS DURATION SEC STAGE 1: 10
BH CV STRESS NUCLEAR ISOTOPE DOSE: 30 MCI
BH CV STRESS PROTOCOL 1: NORMAL
BH CV STRESS RECOVERY BP: NORMAL MMHG
BH CV STRESS RECOVERY HR: 67 BPM
BH CV STRESS STAGE 1: 1
MAXIMAL PREDICTED HEART RATE: 159 BPM
PERCENT MAX PREDICTED HR: 48.43 %
STRESS BASELINE BP: NORMAL MMHG
STRESS BASELINE HR: 58 BPM
STRESS PERCENT HR: 57 %
STRESS POST PEAK BP: NORMAL MMHG
STRESS POST PEAK HR: 77 BPM
STRESS TARGET HR: 135 BPM

## 2023-02-07 ENCOUNTER — OFFICE VISIT (OUTPATIENT)
Dept: CARDIOLOGY | Facility: CLINIC | Age: 63
End: 2023-02-07
Payer: COMMERCIAL

## 2023-02-07 VITALS
OXYGEN SATURATION: 96 % | SYSTOLIC BLOOD PRESSURE: 170 MMHG | HEART RATE: 70 BPM | DIASTOLIC BLOOD PRESSURE: 99 MMHG | BODY MASS INDEX: 32.6 KG/M2 | HEIGHT: 71 IN | WEIGHT: 232.9 LBS

## 2023-02-07 DIAGNOSIS — R06.02 SHORTNESS OF BREATH: ICD-10-CM

## 2023-02-07 DIAGNOSIS — I10 PRIMARY HYPERTENSION: ICD-10-CM

## 2023-02-07 DIAGNOSIS — F17.200 SMOKING: ICD-10-CM

## 2023-02-07 DIAGNOSIS — I25.10 CORONARY ARTERY DISEASE INVOLVING NATIVE CORONARY ARTERY OF NATIVE HEART WITHOUT ANGINA PECTORIS: Primary | ICD-10-CM

## 2023-02-07 DIAGNOSIS — I51.89 GRADE I DIASTOLIC DYSFUNCTION: ICD-10-CM

## 2023-02-07 DIAGNOSIS — E78.5 HYPERLIPIDEMIA, UNSPECIFIED HYPERLIPIDEMIA TYPE: ICD-10-CM

## 2023-02-07 DIAGNOSIS — R60.9 PERIPHERAL EDEMA: ICD-10-CM

## 2023-02-07 PROBLEM — R60.0 PERIPHERAL EDEMA: Status: ACTIVE | Noted: 2023-02-07

## 2023-02-07 PROCEDURE — 99214 OFFICE O/P EST MOD 30 MIN: CPT | Performed by: NURSE PRACTITIONER

## 2023-02-07 RX ORDER — MELOXICAM 15 MG/1
TABLET ORAL EVERY 24 HOURS
COMMUNITY
Start: 2023-01-26 | End: 2023-04-26

## 2023-02-07 NOTE — PROGRESS NOTES
Subjective   Herber Stein is a 62 y.o. male     Chief Complaint   Patient presents with   • Follow-up     ABN STRESS F/U        HPI    Problem List:    1.) CAD status post stenting to RCA in 2012 with bare metal stent ([facility])   a. Stress in 2014 showing posterolateral infarct with minimal martine-infarct ischemia.    b.  Stress test 2/1/2023-no evidence of ischemia, post-rest EF 48% with no focal wall motion abnormalities  2.) HTN  3.) Diabetes mellitus since 37 y/o   4.) Shortness of breath  5.) Normal LV systolic function with EF 60%  5.1) Echo 11/10/14 - mild LVH; EF 50-55%; DD I; basal inferolateral wall segment is hypokinetic, trace AR, TR, MR and KS  6) Smoking Habituation, Cigars    Patient is a 62-year-old male who presents today for follow-up on testing.  He denies any chest pain, pressure, palpitations, fluttering, dizziness, presyncope, syncope, orthopnea or PND.  Patient says he does get some swelling in his legs but mostly just in his feet.  He says he does get fatigued but its like its been for a while.  He said when he had his previous stent placed he will get fatigued walking very short distances.  He says he has none of those symptoms.  He says he feels like he would know if something was wrong.  He denies any shortness of breath with activity.    We went over stress test.  Again patient feels like he is doing well and does not need any additional work-up at this time.  We do not actually have his left heart cath report on the chart from 2012 so he requested that from [facility].    Current Outpatient Medications on File Prior to Visit   Medication Sig Dispense Refill   • amLODIPine-benazepril (LOTREL 5-10) 5-10 MG per capsule TAKE ONE CAPSULE BY MOUTH DAILY 90 capsule 3   • Aspirin Low Dose 81 MG EC tablet TAKE 1 TABLET BY MOUTH DAILY 90 tablet 3   • atenolol (TENORMIN) 25 MG tablet TAKE ONE TABLET BY MOUTH DAILY 90 tablet 3   • atorvastatin (LIPITOR) 40 MG tablet TAKE ONE TABLET BY MOUTH DAILY 90 tablet 3    • busPIRone (BUSPAR) 15 MG tablet Take 15 mg by mouth 2 (Two) Times a Day.     • cetirizine (ZyrTEC) 10 MG tablet Take 10 mg by mouth Daily.     • clopidogrel (PLAVIX) 75 MG tablet TAKE 1 TABLET BY MOUTH DAILY 90 tablet 3   • dapagliflozin Propanediol 10 MG tablet Take 10 mg by mouth Daily.     • gabapentin (NEURONTIN) 300 MG capsule Take 300 mg by mouth Daily.     • meloxicam (MOBIC) 15 MG tablet Daily.     • metFORMIN ER (GLUCOPHAGE-XR) 500 MG 24 hr tablet Take 500 mg by mouth 2 (Two) Times a Day.     • nitroglycerin (Nitrostat) 0.4 MG SL tablet 1 tab under the tongue q 5 min, up to 3x, for CP. Go to ER if it doesn't work. 45 tablet 2   • Omega-3 Fatty Acids (fish oil) 1000 MG capsule capsule TAKE 1 CAPSULE BY MOUTH DAILY 90 capsule 3   • Trulicity 0.75 MG/0.5ML solution pen-injector Inject 0.75 mg under the skin into the appropriate area as directed 1 (One) Time Per Week.       No current facility-administered medications on file prior to visit.       ALLERGIES    Patient has no known allergies.    Past Medical History:   Diagnosis Date   • Arthritis    • Atherosclerotic heart disease of native coronary artery without angina pectoris    • Chest pain 8/31/2016   • Diabetes mellitus (HCC) 8/31/2016   • Heart disease 8/31/2016   • HTN (hypertension) 8/31/2016   • Hyperlipidemia    • Migraine headache    • Neuropathy    • Obesity 8/31/2016   • Sleep apnea    • Umbilical hernia        Social History     Socioeconomic History   • Marital status: Single   Tobacco Use   • Smoking status: Some Days     Types: Cigars   • Smokeless tobacco: Never   Vaping Use   • Vaping Use: Never used   Substance and Sexual Activity   • Alcohol use: Yes     Comment: 6 pack per month   • Drug use: No   • Sexual activity: Defer       Family History   Problem Relation Age of Onset   • Stroke Mother    • Diabetes Mother    • Heart attack Mother    • Other Mother         CABG, Arotic Valve Replacement   • Hypertension Father    • Heart attack  "Brother        Review of Systems   Constitutional: Positive for fatigue (like it has always been ). Negative for appetite change, chills and fever.   HENT: Negative for dental problem, drooling, ear discharge, ear pain, sinus pressure, sinus pain, sneezing and sore throat.    Eyes: Positive for visual disturbance (wears glasses ). Negative for pain, redness and itching.   Respiratory: Negative for cough, choking, shortness of breath and wheezing.    Cardiovascular: Positive for leg swelling (feet swell seldom). Negative for chest pain and palpitations.   Gastrointestinal: Negative for blood in stool, constipation, diarrhea, nausea and vomiting.   Endocrine: Negative.    Genitourinary: Negative for difficulty urinating.   Musculoskeletal: Positive for arthralgias, back pain, gait problem (uses cane ) and neck pain.   Skin: Negative for rash and wound.   Allergic/Immunologic: Positive for environmental allergies. Negative for food allergies.   Neurological: Negative for dizziness, weakness and numbness.   Hematological: Bruises/bleeds easily.   Psychiatric/Behavioral: Positive for sleep disturbance.       Objective   /99   Pulse 70   Ht 180.3 cm (70.98\")   Wt 106 kg (232 lb 14.4 oz)   SpO2 96%   BMI 32.50 kg/m²   Vitals:    02/07/23 1022   BP: 170/99   Pulse: 70   SpO2: 96%   Weight: 106 kg (232 lb 14.4 oz)   Height: 180.3 cm (70.98\")      Lab Results (most recent)     None        Physical Exam  Vitals reviewed.   Constitutional:       General: He is awake.      Appearance: Normal appearance. He is well-developed and well-groomed. He is obese.   HENT:      Head: Normocephalic.   Eyes:      General: Lids are normal.      Comments: Wears glasses    Neck:      Vascular: No carotid bruit, hepatojugular reflux or JVD.   Cardiovascular:      Rate and Rhythm: Normal rate and regular rhythm.      Pulses:           Radial pulses are 2+ on the right side and 2+ on the left side.        Dorsalis pedis pulses are 2+ on " the right side and 2+ on the left side.        Posterior tibial pulses are 2+ on the right side and 2+ on the left side.      Heart sounds: Normal heart sounds.   Pulmonary:      Effort: Pulmonary effort is normal.      Breath sounds: Normal breath sounds and air entry.   Abdominal:      General: Bowel sounds are normal.      Palpations: Abdomen is soft.   Musculoskeletal:      Right lower leg: No edema.      Left lower leg: No edema.      Comments: Uses a cane    Skin:     General: Skin is warm and dry.   Neurological:      Mental Status: He is alert and oriented to person, place, and time.   Psychiatric:         Attention and Perception: Attention and perception normal.         Mood and Affect: Mood and affect normal.         Speech: Speech normal.         Behavior: Behavior normal. Behavior is cooperative.         Thought Content: Thought content normal.         Cognition and Memory: Cognition and memory normal.         Judgment: Judgment normal.         Procedure   Procedures         Assessment & Plan      Diagnosis Plan   1. Coronary artery disease involving native coronary artery of native heart without angina pectoris        2. Primary hypertension        3. Grade I diastolic dysfunction        4. Shortness of breath        5. Smoking        6. Hyperlipidemia, unspecified hyperlipidemia type        7. Peripheral edema            Return in about 6 months (around 8/7/2023).    CAD-patient is on aspirin, beta and statin.  Hypertension-patient is on Lo-Trol and beta-blocker.  He says his blood pressure is only elevated because he just drove 45 minutes and took his medication late.  Diastolic dysfunction/peripheral edema-patient has no signs of failure.  Shortness of breath-stable.  Smoking-encourage cessation.  Hyperlipidemia-patient is on Lipitor.  We requested labs from PCP.  He will continue his medication regimen.  He will follow-up in 6 months or sooner if any changes or he should decide he would like any  additional work-up.       Herber Stein  reports that he has been smoking cigars. He has never used smokeless tobacco.. I have educated him on the risk of diseases from using tobacco products such as cancer, COPD and heart disease.     I advised him to quit and he is not willing to quit.    I spent 3  minutes counseling the patient.           Electronically signed by:  REAL Bernard

## 2023-03-15 RX ORDER — NITROGLYCERIN 0.4 MG/1
TABLET SUBLINGUAL
Qty: 25 TABLET | Refills: 2 | Status: SHIPPED | OUTPATIENT
Start: 2023-03-15

## 2023-09-28 ENCOUNTER — OFFICE VISIT (OUTPATIENT)
Dept: CARDIOLOGY | Facility: CLINIC | Age: 63
End: 2023-09-28
Payer: COMMERCIAL

## 2023-09-28 VITALS
WEIGHT: 228 LBS | HEIGHT: 71 IN | DIASTOLIC BLOOD PRESSURE: 91 MMHG | SYSTOLIC BLOOD PRESSURE: 165 MMHG | HEART RATE: 72 BPM | BODY MASS INDEX: 31.92 KG/M2 | OXYGEN SATURATION: 96 %

## 2023-09-28 DIAGNOSIS — I10 PRIMARY HYPERTENSION: ICD-10-CM

## 2023-09-28 DIAGNOSIS — E78.5 HYPERLIPIDEMIA, UNSPECIFIED HYPERLIPIDEMIA TYPE: ICD-10-CM

## 2023-09-28 DIAGNOSIS — I25.10 CORONARY ARTERY DISEASE INVOLVING NATIVE CORONARY ARTERY OF NATIVE HEART WITHOUT ANGINA PECTORIS: ICD-10-CM

## 2023-09-28 RX ORDER — CLOPIDOGREL BISULFATE 75 MG/1
75 TABLET ORAL DAILY
Qty: 90 TABLET | Refills: 3 | Status: SHIPPED | OUTPATIENT
Start: 2023-09-28

## 2023-09-28 RX ORDER — MELOXICAM 15 MG/1
15 TABLET ORAL DAILY
COMMUNITY

## 2023-09-28 RX ORDER — AMLODIPINE BESYLATE AND BENAZEPRIL HYDROCHLORIDE 5; 10 MG/1; MG/1
1 CAPSULE ORAL DAILY
Qty: 90 CAPSULE | Refills: 3 | Status: SHIPPED | OUTPATIENT
Start: 2023-09-28

## 2023-09-28 RX ORDER — ASPIRIN 81 MG/1
81 TABLET ORAL DAILY
Qty: 90 TABLET | Refills: 3 | Status: SHIPPED | OUTPATIENT
Start: 2023-09-28

## 2023-09-28 RX ORDER — ATENOLOL 25 MG/1
25 TABLET ORAL DAILY
Qty: 90 TABLET | Refills: 3 | Status: SHIPPED | OUTPATIENT
Start: 2023-09-28

## 2023-09-28 RX ORDER — ATORVASTATIN CALCIUM 40 MG/1
40 TABLET, FILM COATED ORAL DAILY
Qty: 90 TABLET | Refills: 3 | Status: SHIPPED | OUTPATIENT
Start: 2023-09-28

## 2023-09-28 NOTE — PROGRESS NOTES
Subjective     Herber Stein is a 63 y.o. male who presents today for Follow-up and Coronary Artery Disease.    CHIEF COMPLIANT  Chief Complaint   Patient presents with    Follow-up    Coronary Artery Disease       Active Problems:  1.) CAD status post stenting to RCA in 2012 with bare metal stent ([facility])   a. Stress in 2014 showing posterolateral infarct with minimal martine-infarct ischemia.    b.  Stress test 2/1/2023-no evidence of ischemia, post-rest EF 48% with no focal wall motion abnormalities  2.) HTN  3.) Diabetes mellitus since 37 y/o   4.) Shortness of breath  5.) Normal LV systolic function with EF 60%  5.1) Echo 11/10/14 - mild LVH; EF 50-55%; DD I; basal inferolateral wall segment is hypokinetic, trace AR, TR, MR and [place]  6) Smoking Habituation, Cigars    HPI  The patient is a 63-year-old male who returns to the office today for routine follow-up.  Overall the patient states he is doing well.  He denies chest pain, shortness of air, syncope, near syncope, orthopnea, PND, palpitations.  He does have chronic back pain issues and have some neuropathy due to this.  His blood pressure is elevated in the office today at 165/91, however, the patient states his blood pressure does not run high at home and he became upset on the way to the office today due to a reckless .  He monitors his blood pressure at home.  We did review labs that were completed in his primary care office earlier this year.  His hemoglobin A1c was 8.6.  Overall cholesterol was 146, HDL 26, LDL 94.    PRIOR MEDS  Current Outpatient Medications on File Prior to Visit   Medication Sig Dispense Refill    busPIRone (BUSPAR) 15 MG tablet Take 1 tablet by mouth 2 (Two) Times a Day.      cetirizine (ZyrTEC) 10 MG tablet Take 1 tablet by mouth Daily.      dapagliflozin Propanediol 10 MG tablet Take 10 mg by mouth Daily.      gabapentin (NEURONTIN) 400 MG capsule Take 1 capsule by mouth Daily.      metFORMIN ER (GLUCOPHAGE-XR) 500 MG 24 hr  tablet Take 1 tablet by mouth 2 (Two) Times a Day.      nitroglycerin (NITROSTAT) 0.4 MG SL tablet Dissolve 1 tablet under tongue at onset of chest pain. May repeat every 5 minutes for up to 3 times total if you need. Call 911 & seek emergency care if no relief with 3rd tablet. 25 tablet 2    Omega-3 Fatty Acids (fish oil) 1000 MG capsule capsule TAKE 1 CAPSULE BY MOUTH DAILY 90 capsule 3    Trulicity 0.75 MG/0.5ML solution pen-injector Inject 0.75 mg under the skin into the appropriate area as directed 1 (One) Time Per Week.      meloxicam (MOBIC) 15 MG tablet Take 1 tablet by mouth Daily.       No current facility-administered medications on file prior to visit.       ALLERGIES  Patient has no known allergies.    HISTORY  Past Medical History:   Diagnosis Date    Arthritis     Atherosclerotic heart disease of native coronary artery without angina pectoris     Chest pain 8/31/2016    Diabetes mellitus 8/31/2016    Heart disease 8/31/2016    HTN (hypertension) 8/31/2016    Hyperlipidemia     Migraine headache     Neuropathy     Obesity 8/31/2016    Sleep apnea     Umbilical hernia        Social History     Socioeconomic History    Marital status: Single   Tobacco Use    Smoking status: Some Days     Types: Cigars    Smokeless tobacco: Never   Vaping Use    Vaping Use: Never used   Substance and Sexual Activity    Alcohol use: Yes     Comment: 6 pack per month    Drug use: No    Sexual activity: Defer       Family History   Problem Relation Age of Onset    Stroke Mother     Diabetes Mother     Heart attack Mother     Other Mother         CABG, Arotic Valve Replacement    Hypertension Father     Heart attack Brother        Review of Systems   Constitutional: Negative.  Negative for chills, diaphoresis, fatigue and fever.   HENT:          Allergies  Rohith. ear infections   Eyes:  Positive for visual disturbance (Cataracts forming; sees eye drLokesh).   Respiratory: Negative.  Negative for apnea, cough, chest tightness,  "shortness of breath and wheezing.    Cardiovascular: Negative.  Negative for chest pain, palpitations and leg swelling.   Gastrointestinal: Negative.  Negative for blood in stool.   Endocrine: Negative.  Negative for cold intolerance and heat intolerance.   Genitourinary: Negative.  Negative for hematuria.   Musculoskeletal:  Positive for arthralgias, back pain (spurs on spine), myalgias (occas. in legs), neck pain (arthritis) and neck stiffness.   Skin: Negative.  Negative for color change, rash and wound.   Allergic/Immunologic: Positive for environmental allergies (seasonal). Negative for food allergies.   Neurological: Negative.  Negative for dizziness, syncope, weakness, light-headedness, numbness and headaches.   Hematological:  Bruises/bleeds easily.   Psychiatric/Behavioral:  Positive for sleep disturbance (due to back pain).      Objective     VITALS: /91 (BP Location: Left arm, Patient Position: Sitting)   Pulse 72   Ht 180.3 cm (70.98\")   Wt 103 kg (228 lb)   SpO2 96%   BMI 31.81 kg/m²     LABS:   Lab Results (most recent)       None            IMAGING:   No Images in the past 120 days found..    EXAM:  Physical Exam  Constitutional:       Appearance: Normal appearance.   Eyes:      Pupils: Pupils are equal, round, and reactive to light.   Cardiovascular:      Rate and Rhythm: Normal rate and regular rhythm.      Pulses:           Carotid pulses are 2+ on the right side and 2+ on the left side.       Radial pulses are 2+ on the right side and 2+ on the left side.        Dorsalis pedis pulses are 2+ on the right side and 2+ on the left side.        Posterior tibial pulses are 2+ on the right side and 2+ on the left side.      Heart sounds: Normal heart sounds.   Pulmonary:      Effort: Pulmonary effort is normal.      Breath sounds: Normal breath sounds.   Abdominal:      General: Bowel sounds are normal.      Palpations: Abdomen is soft.   Musculoskeletal:      Right lower leg: No edema.      " Left lower leg: No edema.   Skin:     General: Skin is warm and dry.      Capillary Refill: Capillary refill takes less than 2 seconds.   Neurological:      General: No focal deficit present.      Mental Status: He is alert and oriented to person, place, and time.   Psychiatric:         Mood and Affect: Mood normal.         Thought Content: Thought content normal.       Procedure   Procedures       Assessment & Plan    Diagnosis Plan   1. Primary hypertension  amLODIPine-benazepril (LOTREL 5-10) 5-10 MG per capsule    atenolol (TENORMIN) 25 MG tablet      2. Coronary artery disease involving native coronary artery of native heart without angina pectoris  aspirin (Aspirin Low Dose) 81 MG EC tablet    atorvastatin (LIPITOR) 40 MG tablet    clopidogrel (PLAVIX) 75 MG tablet      3. Hyperlipidemia, unspecified hyperlipidemia type  atorvastatin (LIPITOR) 40 MG tablet        Plan:  1.  Hypertension: We will continue amlodipine/benazepril, atenolol.  The patient was instructed to monitor BP at home and to notify our office if systolic BP is consistently greater than 130-135 systolic.  Goal BP is 130-135/70-80.  We will adjust antihypertensives as needed.  .  2.  Coronary artery disease: Continue aspirin, Plavix, statin, atenolol, and amlodipine/benazepril.  The patient does have sublingual nitro to use if needed.  We did review instructions on appropriate use.  3.  Hyperlipidemia: Continue statin.  He is due to have lab work completed in the next couple months and will have a lipid panel sent to our office.  Would like for her LDL to be closer to 70.  If his LDL remains a little elevated we will increase his atorvastatin at that time.    Return in about 6 months (around 3/28/2024).    Herber Sarita  reports that he has been smoking cigars. He has never used smokeless tobacco.. I have educated him on the risk of diseases from using tobacco products such as cancer, COPD, and heart disease.     I advised him to quit and he  is not willing to quit.    I spent 3  minutes counseling the patient.     Patient brought in medicine list to appointment, it's been reviewed with patient and med list was updated in the chart.             MEDS ORDERED DURING VISIT:  New Medications Ordered This Visit   Medications    amLODIPine-benazepril (LOTREL 5-10) 5-10 MG per capsule     Sig: Take 1 capsule by mouth Daily.     Dispense:  90 capsule     Refill:  3    aspirin (Aspirin Low Dose) 81 MG EC tablet     Sig: Take 1 tablet by mouth Daily.     Dispense:  90 tablet     Refill:  3    atenolol (TENORMIN) 25 MG tablet     Sig: Take 1 tablet by mouth Daily.     Dispense:  90 tablet     Refill:  3    atorvastatin (LIPITOR) 40 MG tablet     Sig: Take 1 tablet by mouth Daily.     Dispense:  90 tablet     Refill:  3    clopidogrel (PLAVIX) 75 MG tablet     Sig: Take 1 tablet by mouth Daily.     Dispense:  90 tablet     Refill:  3       DISCONTINUED MEDS DURING VISIT:   Medications Discontinued During This Encounter   Medication Reason    atorvastatin (LIPITOR) 40 MG tablet Reorder    amLODIPine-benazepril (LOTREL 5-10) 5-10 MG per capsule Reorder    atenolol (TENORMIN) 25 MG tablet Reorder    Aspirin Low Dose 81 MG EC tablet Reorder    clopidogrel (PLAVIX) 75 MG tablet Reorder          This document has been electronically signed by REAL Jean  October 3, 2023 18:38 EDT    Dictated Utilizing Dragon Dictation: Part of this note may be an electronic transcription/translation of spoken language to printed text using the Dragon Dictation System

## 2024-03-28 ENCOUNTER — OFFICE VISIT (OUTPATIENT)
Dept: CARDIOLOGY | Facility: CLINIC | Age: 64
End: 2024-03-28
Payer: COMMERCIAL

## 2024-03-28 VITALS
WEIGHT: 225 LBS | SYSTOLIC BLOOD PRESSURE: 139 MMHG | DIASTOLIC BLOOD PRESSURE: 86 MMHG | HEIGHT: 71 IN | BODY MASS INDEX: 31.5 KG/M2 | HEART RATE: 73 BPM | OXYGEN SATURATION: 97 %

## 2024-03-28 DIAGNOSIS — E78.5 HYPERLIPIDEMIA, UNSPECIFIED HYPERLIPIDEMIA TYPE: ICD-10-CM

## 2024-03-28 DIAGNOSIS — I25.10 CORONARY ARTERY DISEASE INVOLVING NATIVE CORONARY ARTERY OF NATIVE HEART WITHOUT ANGINA PECTORIS: ICD-10-CM

## 2024-03-28 DIAGNOSIS — I10 PRIMARY HYPERTENSION: Primary | ICD-10-CM

## 2024-03-28 PROCEDURE — 1160F RVW MEDS BY RX/DR IN RCRD: CPT | Performed by: CLINICAL NURSE SPECIALIST

## 2024-03-28 PROCEDURE — 3075F SYST BP GE 130 - 139MM HG: CPT | Performed by: CLINICAL NURSE SPECIALIST

## 2024-03-28 PROCEDURE — 99214 OFFICE O/P EST MOD 30 MIN: CPT | Performed by: CLINICAL NURSE SPECIALIST

## 2024-03-28 PROCEDURE — 1159F MED LIST DOCD IN RCRD: CPT | Performed by: CLINICAL NURSE SPECIALIST

## 2024-03-28 PROCEDURE — 3079F DIAST BP 80-89 MM HG: CPT | Performed by: CLINICAL NURSE SPECIALIST

## 2024-03-28 PROCEDURE — 93000 ELECTROCARDIOGRAM COMPLETE: CPT | Performed by: CLINICAL NURSE SPECIALIST

## 2024-03-28 RX ORDER — AMLODIPINE BESYLATE AND BENAZEPRIL HYDROCHLORIDE 5; 10 MG/1; MG/1
1 CAPSULE ORAL DAILY
Qty: 90 CAPSULE | Refills: 3 | Status: SHIPPED | OUTPATIENT
Start: 2024-03-28

## 2024-03-28 RX ORDER — CLOPIDOGREL BISULFATE 75 MG/1
75 TABLET ORAL DAILY
Qty: 90 TABLET | Refills: 3 | Status: SHIPPED | OUTPATIENT
Start: 2024-03-28

## 2024-03-28 RX ORDER — ATENOLOL 25 MG/1
25 TABLET ORAL DAILY
Qty: 90 TABLET | Refills: 3 | Status: SHIPPED | OUTPATIENT
Start: 2024-03-28

## 2024-03-28 RX ORDER — PANTOPRAZOLE SODIUM 20 MG/1
20 TABLET, DELAYED RELEASE ORAL DAILY
COMMUNITY

## 2024-03-28 RX ORDER — ATORVASTATIN CALCIUM 40 MG/1
40 TABLET, FILM COATED ORAL DAILY
Qty: 90 TABLET | Refills: 3 | Status: SHIPPED | OUTPATIENT
Start: 2024-03-28

## 2024-03-28 RX ORDER — ASPIRIN 81 MG/1
81 TABLET ORAL DAILY
Qty: 90 TABLET | Refills: 3 | Status: SHIPPED | OUTPATIENT
Start: 2024-03-28

## 2024-03-28 NOTE — PROGRESS NOTES
Subjective     Herber Stein is a 63 y.o. male who presents today for Follow-up.    CHIEF COMPLIANT  Chief Complaint   Patient presents with    Follow-up       Active Problems:  1.) CAD status post stenting to RCA in 2012 with bare metal stent ([facility])   a. Stress in 2014 showing posterolateral infarct with minimal martine-infarct ischemia.    b.  Stress test 2/1/2023-no evidence of ischemia, post-rest EF 48% with no focal wall motion abnormalities  2.) HTN  3.) Diabetes mellitus since 35 y/o   4.) Shortness of breath  5.) Normal LV systolic function with EF 60%  5.1) Echo 11/10/14 - mild LVH; EF 50-55%; DD I; basal inferolateral wall segment is hypokinetic, trace AR, TR, MR and NE  6) Smoking Habituation, Cigars    HPI  The patient is a 63-year-old male that returns for routine follow-up.  Overall the patient is doing well.  He denies chest pain, shortness of air, syncope, near syncope or palpitations.  His heart rate and blood pressure have been stable.  Overall he is doing well.  He denies lower extremity edema.  He states that he had his lab work done about 1 month ago at his primary care office and was told that everything looked good.  He said he was told his cholesterol was under good control.  We do not have these labs for review but will request them.    PRIOR MEDS  Current Outpatient Medications on File Prior to Visit   Medication Sig Dispense Refill    busPIRone (BUSPAR) 15 MG tablet Take 1 tablet by mouth 2 (Two) Times a Day.      cetirizine (ZyrTEC) 10 MG tablet Take 1 tablet by mouth Daily.      dapagliflozin Propanediol 10 MG tablet Take 10 mg by mouth Daily.      gabapentin (NEURONTIN) 400 MG capsule Take 1 capsule by mouth Daily.      meloxicam (MOBIC) 15 MG tablet Take 1 tablet by mouth Daily.      metFORMIN ER (GLUCOPHAGE-XR) 500 MG 24 hr tablet Take 1 tablet by mouth 2 (Two) Times a Day.      nitroglycerin (NITROSTAT) 0.4 MG SL tablet Dissolve 1 tablet under tongue at onset of chest pain. May repeat  every 5 minutes for up to 3 times total if you need. Call 911 & seek emergency care if no relief with 3rd tablet. 25 tablet 2    Omega-3 Fatty Acids (fish oil) 1000 MG capsule capsule TAKE 1 CAPSULE BY MOUTH DAILY 90 capsule 3    pantoprazole (PROTONIX) 20 MG EC tablet Take 1 tablet by mouth Daily.      Trulicity 0.75 MG/0.5ML solution pen-injector Inject 0.75 mg under the skin into the appropriate area as directed 1 (One) Time Per Week.      [DISCONTINUED] amLODIPine-benazepril (LOTREL 5-10) 5-10 MG per capsule Take 1 capsule by mouth Daily. 90 capsule 3    [DISCONTINUED] aspirin (Aspirin Low Dose) 81 MG EC tablet Take 1 tablet by mouth Daily. 90 tablet 3    [DISCONTINUED] atenolol (TENORMIN) 25 MG tablet Take 1 tablet by mouth Daily. 90 tablet 3    [DISCONTINUED] atorvastatin (LIPITOR) 40 MG tablet Take 1 tablet by mouth Daily. 90 tablet 3    [DISCONTINUED] clopidogrel (PLAVIX) 75 MG tablet Take 1 tablet by mouth Daily. 90 tablet 3     No current facility-administered medications on file prior to visit.       ALLERGIES  Patient has no known allergies.    HISTORY  Past Medical History:   Diagnosis Date    Arthritis     Atherosclerotic heart disease of native coronary artery without angina pectoris     Chest pain 8/31/2016    Diabetes mellitus 8/31/2016    Heart disease 8/31/2016    HTN (hypertension) 8/31/2016    Hyperlipidemia     Migraine headache     Neuropathy     Obesity 8/31/2016    Sleep apnea     Umbilical hernia        Social History     Socioeconomic History    Marital status: Single   Tobacco Use    Smoking status: Some Days     Types: Cigars    Smokeless tobacco: Never   Vaping Use    Vaping status: Never Used   Substance and Sexual Activity    Alcohol use: Yes     Comment: 6 pack per month    Drug use: No    Sexual activity: Defer       Family History   Problem Relation Age of Onset    Stroke Mother     Diabetes Mother     Heart attack Mother     Other Mother         CABG, Arotic Valve Replacement     "Hypertension Father     Heart attack Brother        Review of Systems   Constitutional:  Positive for fatigue (with exertion). Negative for chills, diaphoresis and fever.   Eyes: Negative.  Negative for visual disturbance.   Respiratory:  Positive for cough (occas.). Negative for apnea, chest tightness, shortness of breath and wheezing.    Cardiovascular:  Negative for chest pain, palpitations and leg swelling.   Gastrointestinal: Negative.  Negative for blood in stool.   Endocrine: Negative.  Negative for cold intolerance and heat intolerance.   Genitourinary: Negative.  Negative for hematuria.   Musculoskeletal:  Positive for back pain, gait problem (uses cane), myalgias (in calves occas.), neck pain and neck stiffness. Negative for arthralgias.   Skin: Negative.  Negative for color change, rash and wound.   Allergic/Immunologic: Positive for environmental allergies (seasonal). Negative for food allergies.   Neurological:  Positive for numbness (feet occas.; has neuropathy) and headaches (occas.). Negative for dizziness, syncope, weakness and light-headedness.   Hematological:  Bruises/bleeds easily.   Psychiatric/Behavioral: Negative.  Negative for sleep disturbance.        Objective     VITALS: /86 (BP Location: Right arm, Patient Position: Sitting)   Pulse 73   Ht 180.3 cm (70.98\")   Wt 102 kg (225 lb)   SpO2 97%   BMI 31.40 kg/m²     LABS:   Lab Results (most recent)       None            IMAGING:   No Images in the past 120 days found..    EXAM:  Constitutional:       Appearance: Normal appearance.   Eyes:      Pupils: Pupils are equal, round, and reactive to light.   Cardiovascular:      Rate and Rhythm: Normal rate and regular rhythm.      Pulses:           Carotid pulses are 2+ on the right side and 2+ on the left side.       Radial pulses are 2+ on the right side and 2+ on the left side.        Dorsalis pedis pulses are 2+ on the right side and 2+ on the left side.        Posterior tibial " pulses are 2+ on the right side and 2+ on the left side.      Heart sounds: Normal heart sounds.   Pulmonary:      Effort: Pulmonary effort is normal.      Breath sounds: Normal breath sounds.   Abdominal:      General: Bowel sounds are normal.      Palpations: Abdomen is soft.   Musculoskeletal:      Right lower leg: No edema.      Left lower leg: No edema.   Skin:     General: Skin is warm and dry.      Capillary Refill: Capillary refill takes less than 2 seconds.   Neurological:      General: No focal deficit present.      Mental Status: He is alert and oriented to person, place, and time.   Psychiatric:         Mood and Affect: Mood normal.         Thought Content: Thought content normal.   Procedure     ECG 12 Lead    Date/Time: 3/28/2024 1:30 PM  Performed by: Thania Winslow APRN    Authorized by: Thania Winslow APRN  Comparison: compared with previous ECG from 7/12/2022  Rhythm: sinus rhythm  BPM: 67  Conduction: incomplete right bundle branch block  QRS axis: left             Assessment & Plan    Diagnosis Plan   1. Primary hypertension  amLODIPine-benazepril (LOTREL 5-10) 5-10 MG per capsule    atenolol (TENORMIN) 25 MG tablet      2. Coronary artery disease involving native coronary artery of native heart without angina pectoris  ECG 12 Lead    atorvastatin (LIPITOR) 40 MG tablet    clopidogrel (PLAVIX) 75 MG tablet    aspirin (Aspirin Low Dose) 81 MG EC tablet      3. Hyperlipidemia, unspecified hyperlipidemia type  atorvastatin (LIPITOR) 40 MG tablet        Plan:  1.  Hypertension: Blood pressure is under relatively good control with current medication regimen.  Will continue amlodipine/benazepril, atenolol.  The patient was instructed to monitor BP at home and to notify our office if systolic BP is consistently greater than 130-135 systolic.  Goal BP is 130-135/70-80.  We will adjust antihypertensives as needed.  2.  Coronary artery disease: Continue aspirin, Plavix, statin, atenolol, and  amlodipine/benazepril.  The patient does have sublingual nitro to use if needed.  We did review instructions on appropriate use and when to seek emergency treatment.  3.  Hyperlipidemia: Continue statin.  We will request most recent labs from primary care office for review.    Return in about 6 months (around 9/28/2024).    Herber Stein  reports that he has been smoking cigars. He has never used smokeless tobacco. I have educated him on the risk of diseases from using tobacco products such as cancer, COPD, and heart disease.     I advised him to quit and he is not willing to quit.    I spent 3  minutes counseling the patient.       Patient brought in medicine bottles to appointment, they have been gone through with the patient. Med list was updated in the chart.              MEDS ORDERED DURING VISIT:  New Medications Ordered This Visit   Medications    amLODIPine-benazepril (LOTREL 5-10) 5-10 MG per capsule     Sig: Take 1 capsule by mouth Daily.     Dispense:  90 capsule     Refill:  3    atenolol (TENORMIN) 25 MG tablet     Sig: Take 1 tablet by mouth Daily.     Dispense:  90 tablet     Refill:  3    atorvastatin (LIPITOR) 40 MG tablet     Sig: Take 1 tablet by mouth Daily.     Dispense:  90 tablet     Refill:  3    clopidogrel (PLAVIX) 75 MG tablet     Sig: Take 1 tablet by mouth Daily.     Dispense:  90 tablet     Refill:  3    aspirin (Aspirin Low Dose) 81 MG EC tablet     Sig: Take 1 tablet by mouth Daily.     Dispense:  90 tablet     Refill:  3       DISCONTINUED MEDS DURING VISIT:   Medications Discontinued During This Encounter   Medication Reason    amLODIPine-benazepril (LOTREL 5-10) 5-10 MG per capsule Reorder    aspirin (Aspirin Low Dose) 81 MG EC tablet Reorder    atenolol (TENORMIN) 25 MG tablet Reorder    atorvastatin (LIPITOR) 40 MG tablet Reorder    clopidogrel (PLAVIX) 75 MG tablet Reorder          This document has been electronically signed by REAL Jean  March 28, 2024 14:06  EDT    Dictated Utilizing Dragon Dictation: Part of this note may be an electronic transcription/translation of spoken language to printed text using the Dragon Dictation System

## 2024-10-01 ENCOUNTER — OFFICE VISIT (OUTPATIENT)
Dept: CARDIOLOGY | Facility: CLINIC | Age: 64
End: 2024-10-01
Payer: COMMERCIAL

## 2024-10-01 VITALS
WEIGHT: 226 LBS | BODY MASS INDEX: 31.64 KG/M2 | OXYGEN SATURATION: 97 % | HEIGHT: 71 IN | HEART RATE: 72 BPM | DIASTOLIC BLOOD PRESSURE: 85 MMHG | SYSTOLIC BLOOD PRESSURE: 138 MMHG

## 2024-10-01 DIAGNOSIS — E78.5 HYPERLIPIDEMIA, UNSPECIFIED HYPERLIPIDEMIA TYPE: ICD-10-CM

## 2024-10-01 DIAGNOSIS — I10 PRIMARY HYPERTENSION: Primary | ICD-10-CM

## 2024-10-01 DIAGNOSIS — I25.10 CORONARY ARTERY DISEASE INVOLVING NATIVE CORONARY ARTERY OF NATIVE HEART WITHOUT ANGINA PECTORIS: ICD-10-CM

## 2024-10-01 PROCEDURE — 3075F SYST BP GE 130 - 139MM HG: CPT | Performed by: CLINICAL NURSE SPECIALIST

## 2024-10-01 PROCEDURE — 99214 OFFICE O/P EST MOD 30 MIN: CPT | Performed by: CLINICAL NURSE SPECIALIST

## 2024-10-01 PROCEDURE — 3079F DIAST BP 80-89 MM HG: CPT | Performed by: CLINICAL NURSE SPECIALIST

## 2024-10-01 NOTE — PROGRESS NOTES
Subjective     Herber Stein is a 64 y.o. male who presents today for Follow-up (6mth).    CHIEF COMPLIANT  Chief Complaint   Patient presents with    Follow-up     6mth       Active Problems:  1.) CAD status post stenting to RCA in 2012 with bare metal stent ([facility])   a. Stress in 2014 showing posterolateral infarct with minimal martine-infarct ischemia.    b.  Stress test 2/1/2023-no evidence of ischemia, post-rest EF 48% with no focal wall motion abnormalities  2.) HTN  3.) Diabetes mellitus since 35 y/o   4.) Shortness of breath  5.) Normal LV systolic function with EF 60%  5.1) Echo 11/10/14 - mild LVH; EF 50-55%; DD I; basal inferolateral wall segment is hypokinetic, trace AR, TR, MR and PA  6) Smoking Habituation, Cigars    HPI  The patient is a 64 year old male that returns for follow up.  He denies chest pain, worsening dyspnea, syncope, near syncope or palpitations.  HR and BP has been stable.  He does feel like he has been under increased stress with some family issues.   Most recent labs reviewed.  A1C is elevated at 10.8.  Lipid panel shows LDL is 105, but the patient states he was not NPO when this was drawn.    He states they are going to check his labs again in 3 months.    PRIOR MEDS  Current Outpatient Medications on File Prior to Visit   Medication Sig Dispense Refill    amLODIPine-benazepril (LOTREL 5-10) 5-10 MG per capsule Take 1 capsule by mouth Daily. 90 capsule 3    aspirin (Aspirin Low Dose) 81 MG EC tablet Take 1 tablet by mouth Daily. 90 tablet 3    atenolol (TENORMIN) 25 MG tablet Take 1 tablet by mouth Daily. 90 tablet 3    atorvastatin (LIPITOR) 40 MG tablet Take 1 tablet by mouth Daily. 90 tablet 3    busPIRone (BUSPAR) 15 MG tablet Take 1 tablet by mouth 2 (Two) Times a Day.      cetirizine (ZyrTEC) 10 MG tablet Take 1 tablet by mouth Daily.      clopidogrel (PLAVIX) 75 MG tablet Take 1 tablet by mouth Daily. 90 tablet 3    dapagliflozin Propanediol 10 MG tablet Take 10 mg by mouth  Daily.      gabapentin (NEURONTIN) 400 MG capsule Take 1 capsule by mouth Daily.      meloxicam (MOBIC) 15 MG tablet Take 1 tablet by mouth Daily.      metFORMIN ER (GLUCOPHAGE-XR) 500 MG 24 hr tablet Take 1 tablet by mouth 2 (Two) Times a Day.      nitroglycerin (NITROSTAT) 0.4 MG SL tablet Dissolve 1 tablet under tongue at onset of chest pain. May repeat every 5 minutes for up to 3 times total if you need. Call 911 & seek emergency care if no relief with 3rd tablet. 25 tablet 2    Omega-3 Fatty Acids (fish oil) 1000 MG capsule capsule TAKE 1 CAPSULE BY MOUTH DAILY 90 capsule 3    pantoprazole (PROTONIX) 20 MG EC tablet Take 1 tablet by mouth Daily.      Trulicity 0.75 MG/0.5ML solution pen-injector Inject 0.75 mg under the skin into the appropriate area as directed 1 (One) Time Per Week.       No current facility-administered medications on file prior to visit.       ALLERGIES  Patient has no known allergies.    HISTORY  Past Medical History:   Diagnosis Date    Arthritis     Atherosclerotic heart disease of native coronary artery without angina pectoris     Chest pain 8/31/2016    Diabetes mellitus 8/31/2016    Heart disease 8/31/2016    HTN (hypertension) 8/31/2016    Hyperlipidemia     Migraine headache     Neuropathy     Obesity 8/31/2016    Sleep apnea     Umbilical hernia        Social History     Socioeconomic History    Marital status: Single   Tobacco Use    Smoking status: Some Days     Types: Cigars    Smokeless tobacco: Never   Vaping Use    Vaping status: Never Used   Substance and Sexual Activity    Alcohol use: Yes     Comment: 6 pack per month    Drug use: No    Sexual activity: Defer       Family History   Problem Relation Age of Onset    Stroke Mother     Diabetes Mother     Heart attack Mother     Other Mother         CABG, Arotic Valve Replacement    Hypertension Father     Heart attack Brother        Review of Systems   Constitutional:  Positive for fatigue.   HENT:  Positive for rhinorrhea  "(allergies). Negative for congestion and sore throat.    Eyes:  Positive for visual disturbance (Adjusting to bifocals).   Respiratory:  Negative for chest tightness and shortness of breath.    Cardiovascular:  Negative for chest pain, palpitations and leg swelling.   Gastrointestinal: Negative.    Genitourinary: Negative.    Musculoskeletal:  Positive for gait problem (Ambulates w/ cane).   Allergic/Immunologic: Positive for environmental allergies (Seasonal).   Neurological:  Negative for dizziness, syncope, weakness, light-headedness, numbness and headaches.   Hematological:  Bruises/bleeds easily (Bruises).   Psychiatric/Behavioral:  Positive for sleep disturbance (States he doesn't always sleep well, sleeps best when propped up. unsure if he has SoB at HS.).        Objective     VITALS: /85   Pulse 72   Ht 180.3 cm (71\")   Wt 103 kg (226 lb)   SpO2 97%   BMI 31.52 kg/m²     LABS:   Lab Results (most recent)       None            IMAGING:   No Images in the past 120 days found..    EXAM:  Constitutional:       Appearance: Normal appearance.   Eyes:      Pupils: Pupils are equal, round, and reactive to light.   Cardiovascular:      Rate and Rhythm: Normal rate and regular rhythm.      Pulses:           Carotid pulses are 2+ on the right side and 2+ on the left side.       Radial pulses are 2+ on the right side and 2+ on the left side.        Dorsalis pedis pulses are 2+ on the right side and 2+ on the left side.        Posterior tibial pulses are 2+ on the right side and 2+ on the left side.      Heart sounds: Normal heart sounds.   Pulmonary:      Effort: Pulmonary effort is normal.      Breath sounds: Normal breath sounds.   Abdominal:      General: Bowel sounds are normal.      Palpations: Abdomen is soft.   Musculoskeletal:      Right lower leg: No edema.      Left lower leg: No edema.   Skin:     General: Skin is warm and dry.      Capillary Refill: Capillary refill takes less than 2 seconds. "   Neurological:      General: No focal deficit present.      Mental Status: He is alert and oriented to person, place, and time.   Psychiatric:         Mood and Affect: Mood normal.         Thought Content: Thought content normal.   Procedure   Procedures       Assessment & Plan    Diagnosis Plan   1. Primary hypertension        2. Coronary artery disease involving native coronary artery of native heart without angina pectoris        3. Hyperlipidemia, unspecified hyperlipidemia type          Plan:  1.  Hypertension: Patient's blood pressure is borderline today but he states it runs within a more acceptable range at home.  Will continue current medications for now.  The patient was instructed to monitor BP at home and to notify our office if systolic BP is consistently greater than 130-135 systolic.  Goal BP is 130-135/70-80.   2.  Coronary artery disease: Continue medical management.  The patient does have sublingual nitro to use if needed.  We did review instructions on appropriate use and when to seek emergency treatment.  He denies current chest pain symptoms or worsening dyspnea.  3.  Hyperlipidemia: Continue statin.  Will have labs sent to our office when repeated in 3 months.      Return in about 6 months (around 4/1/2025).    Herber Stein  reports that he has been smoking cigars. He has never used smokeless tobacco. I have educated him on the risk of diseases from using tobacco products such as cancer, COPD, and heart disease.     I advised him to quit and he is not willing to quit.      BMI is >= 30 and <35. (Class 1 Obesity). The following options were offered after discussion;: referral to primary care           MEDS ORDERED DURING VISIT:  No orders of the defined types were placed in this encounter.      DISCONTINUED MEDS DURING VISIT:   There are no discontinued medications.       This document has been electronically signed by REAL Jean  October 1, 2024 13:03 EDT    Dictated Utilizing  Dragon Dictation: Part of this note may be an electronic transcription/translation of spoken language to printed text using the Dragon Dictation System

## 2025-04-01 ENCOUNTER — OFFICE VISIT (OUTPATIENT)
Dept: CARDIOLOGY | Facility: CLINIC | Age: 65
End: 2025-04-01
Payer: COMMERCIAL

## 2025-04-01 VITALS
OXYGEN SATURATION: 98 % | DIASTOLIC BLOOD PRESSURE: 86 MMHG | BODY MASS INDEX: 31.42 KG/M2 | HEIGHT: 71 IN | WEIGHT: 224.4 LBS | SYSTOLIC BLOOD PRESSURE: 164 MMHG | HEART RATE: 74 BPM

## 2025-04-01 DIAGNOSIS — I10 PRIMARY HYPERTENSION: Primary | ICD-10-CM

## 2025-04-01 DIAGNOSIS — E78.5 HYPERLIPIDEMIA, UNSPECIFIED HYPERLIPIDEMIA TYPE: ICD-10-CM

## 2025-04-01 DIAGNOSIS — I25.10 CORONARY ARTERY DISEASE INVOLVING NATIVE CORONARY ARTERY OF NATIVE HEART WITHOUT ANGINA PECTORIS: ICD-10-CM

## 2025-04-01 PROCEDURE — 3077F SYST BP >= 140 MM HG: CPT | Performed by: CLINICAL NURSE SPECIALIST

## 2025-04-01 PROCEDURE — 93000 ELECTROCARDIOGRAM COMPLETE: CPT | Performed by: CLINICAL NURSE SPECIALIST

## 2025-04-01 PROCEDURE — 99214 OFFICE O/P EST MOD 30 MIN: CPT | Performed by: CLINICAL NURSE SPECIALIST

## 2025-04-01 PROCEDURE — 3079F DIAST BP 80-89 MM HG: CPT | Performed by: CLINICAL NURSE SPECIALIST
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
## 2025-04-12 DIAGNOSIS — E78.5 HYPERLIPIDEMIA, UNSPECIFIED HYPERLIPIDEMIA TYPE: ICD-10-CM

## 2025-04-12 DIAGNOSIS — I25.10 CORONARY ARTERY DISEASE INVOLVING NATIVE CORONARY ARTERY OF NATIVE HEART WITHOUT ANGINA PECTORIS: ICD-10-CM

## 2025-04-12 DIAGNOSIS — I10 PRIMARY HYPERTENSION: ICD-10-CM

## 2025-04-14 RX ORDER — ASPIRIN 81 MG/1
81 TABLET, COATED ORAL DAILY
Qty: 30 TABLET | Refills: 11 | Status: SHIPPED | OUTPATIENT
Start: 2025-04-14

## 2025-04-14 RX ORDER — ATENOLOL 25 MG/1
25 TABLET ORAL DAILY
Qty: 30 TABLET | Refills: 11 | Status: SHIPPED | OUTPATIENT
Start: 2025-04-14

## 2025-04-14 RX ORDER — AMLODIPINE AND BENAZEPRIL HYDROCHLORIDE 5; 10 MG/1; MG/1
1 CAPSULE ORAL DAILY
Qty: 30 CAPSULE | Refills: 11 | Status: SHIPPED | OUTPATIENT
Start: 2025-04-14

## 2025-04-14 RX ORDER — ATORVASTATIN CALCIUM 40 MG/1
40 TABLET, FILM COATED ORAL DAILY
Qty: 30 TABLET | Refills: 11 | Status: SHIPPED | OUTPATIENT
Start: 2025-04-14 | End: 2025-04-18 | Stop reason: SDUPTHER

## 2025-04-14 RX ORDER — CLOPIDOGREL BISULFATE 75 MG/1
75 TABLET ORAL DAILY
Qty: 30 TABLET | Refills: 11 | Status: SHIPPED | OUTPATIENT
Start: 2025-04-14

## 2025-04-17 ENCOUNTER — RESULTS FOLLOW-UP (OUTPATIENT)
Dept: CARDIOLOGY | Facility: CLINIC | Age: 65
End: 2025-04-17
Payer: COMMERCIAL

## 2025-04-17 DIAGNOSIS — I25.10 CORONARY ARTERY DISEASE INVOLVING NATIVE CORONARY ARTERY OF NATIVE HEART WITHOUT ANGINA PECTORIS: ICD-10-CM

## 2025-04-17 DIAGNOSIS — E78.5 HYPERLIPIDEMIA, UNSPECIFIED HYPERLIPIDEMIA TYPE: ICD-10-CM

## 2025-04-17 NOTE — PROGRESS NOTES
Overall cholesterol 151, triglycerides elevated 248.  HDL is low at 27.  LDL is at 83.  I would recommend we increase atorvastatin to 80mg daily and recheck lipid panel and hepatic function panel in three months.

## 2025-04-18 RX ORDER — ATORVASTATIN CALCIUM 80 MG/1
80 TABLET, FILM COATED ORAL DAILY
Qty: 90 TABLET | Refills: 3 | Status: SHIPPED | OUTPATIENT
Start: 2025-04-18

## 2025-04-18 NOTE — TELEPHONE ENCOUNTER
Thania Winslow APRN to Me (Selected Message)      4/17/25  5:30 PM  Note      Overall cholesterol 151, triglycerides elevated 248.  HDL is low at 27.  LDL is at 83.  I would recommend we increase atorvastatin to 80mg daily and recheck lipid panel and hepatic function panel in three months.